# Patient Record
Sex: FEMALE | Race: WHITE | NOT HISPANIC OR LATINO | Employment: FULL TIME | URBAN - METROPOLITAN AREA
[De-identification: names, ages, dates, MRNs, and addresses within clinical notes are randomized per-mention and may not be internally consistent; named-entity substitution may affect disease eponyms.]

---

## 2020-10-19 ENCOUNTER — OFFICE VISIT (OUTPATIENT)
Dept: OBGYN CLINIC | Facility: CLINIC | Age: 35
End: 2020-10-19
Payer: COMMERCIAL

## 2020-10-19 VITALS
HEIGHT: 67 IN | TEMPERATURE: 97.9 F | WEIGHT: 155.4 LBS | SYSTOLIC BLOOD PRESSURE: 110 MMHG | DIASTOLIC BLOOD PRESSURE: 66 MMHG | BODY MASS INDEX: 24.39 KG/M2

## 2020-10-19 DIAGNOSIS — N89.8 VAGINAL DISCHARGE: Primary | ICD-10-CM

## 2020-10-19 PROCEDURE — 99203 OFFICE O/P NEW LOW 30 MIN: CPT | Performed by: OBSTETRICS & GYNECOLOGY

## 2020-11-02 ENCOUNTER — TELEPHONE (OUTPATIENT)
Dept: OBGYN CLINIC | Facility: CLINIC | Age: 35
End: 2020-11-02

## 2020-11-02 DIAGNOSIS — R30.0 DYSURIA: Primary | ICD-10-CM

## 2020-11-03 ENCOUNTER — OFFICE VISIT (OUTPATIENT)
Dept: INTERNAL MEDICINE CLINIC | Facility: CLINIC | Age: 35
End: 2020-11-03
Payer: COMMERCIAL

## 2020-11-03 VITALS
SYSTOLIC BLOOD PRESSURE: 112 MMHG | WEIGHT: 151.6 LBS | BODY MASS INDEX: 24.36 KG/M2 | HEART RATE: 98 BPM | RESPIRATION RATE: 18 BRPM | HEIGHT: 66 IN | DIASTOLIC BLOOD PRESSURE: 62 MMHG | TEMPERATURE: 98.2 F | OXYGEN SATURATION: 100 %

## 2020-11-03 DIAGNOSIS — R21 SKIN RASH: ICD-10-CM

## 2020-11-03 DIAGNOSIS — R30.0 DYSURIA: Primary | ICD-10-CM

## 2020-11-03 DIAGNOSIS — N94.9 GENITAL LESION, FEMALE: ICD-10-CM

## 2020-11-03 DIAGNOSIS — E04.1 THYROID NODULE: ICD-10-CM

## 2020-11-03 PROBLEM — J30.2 SEASONAL ALLERGIES: Status: ACTIVE | Noted: 2020-08-05

## 2020-11-03 PROBLEM — Z86.19 HISTORY OF LYME DISEASE: Status: ACTIVE | Noted: 2019-07-30

## 2020-11-03 LAB
BILIRUB UR QL STRIP: NEGATIVE
CLARITY UR: NORMAL
COLOR UR: YELLOW
GLUCOSE UR STRIP-MCNC: NEGATIVE MG/DL
HGB UR QL STRIP.AUTO: NEGATIVE
KETONES UR STRIP-MCNC: NEGATIVE MG/DL
LEUKOCYTE ESTERASE UR QL STRIP: NEGATIVE
NITRITE UR QL STRIP: NEGATIVE
PH UR STRIP.AUTO: 7.5 [PH]
PROT UR STRIP-MCNC: NEGATIVE MG/DL
SL AMB  POCT GLUCOSE, UA: ABNORMAL
SL AMB LEUKOCYTE ESTERASE,UA: ABNORMAL
SL AMB POCT BILIRUBIN,UA: ABNORMAL
SL AMB POCT BLOOD,UA: 10
SL AMB POCT CLARITY,UA: ABNORMAL
SL AMB POCT COLOR,UA: YELLOW
SL AMB POCT KETONES,UA: ABNORMAL
SL AMB POCT NITRITE,UA: ABNORMAL
SL AMB POCT PH,UA: 7.5
SL AMB POCT SPECIFIC GRAVITY,UA: 1.01
SL AMB POCT URINE PROTEIN: ABNORMAL
SL AMB POCT UROBILINOGEN: ABNORMAL
SP GR UR STRIP.AUTO: 1.02 (ref 1–1.03)
UROBILINOGEN UR QL STRIP.AUTO: 0.2 E.U./DL

## 2020-11-03 PROCEDURE — 81002 URINALYSIS NONAUTO W/O SCOPE: CPT | Performed by: INTERNAL MEDICINE

## 2020-11-03 PROCEDURE — 81003 URINALYSIS AUTO W/O SCOPE: CPT | Performed by: INTERNAL MEDICINE

## 2020-11-03 PROCEDURE — 3008F BODY MASS INDEX DOCD: CPT | Performed by: INTERNAL MEDICINE

## 2020-11-03 PROCEDURE — 3725F SCREEN DEPRESSION PERFORMED: CPT | Performed by: INTERNAL MEDICINE

## 2020-11-03 PROCEDURE — 1036F TOBACCO NON-USER: CPT | Performed by: INTERNAL MEDICINE

## 2020-11-03 PROCEDURE — 99204 OFFICE O/P NEW MOD 45 MIN: CPT | Performed by: INTERNAL MEDICINE

## 2020-11-04 ENCOUNTER — TELEPHONE (OUTPATIENT)
Dept: INTERNAL MEDICINE CLINIC | Facility: CLINIC | Age: 35
End: 2020-11-04

## 2020-11-04 ENCOUNTER — TELEPHONE (OUTPATIENT)
Dept: OBGYN CLINIC | Facility: CLINIC | Age: 35
End: 2020-11-04

## 2021-04-09 DIAGNOSIS — Z23 ENCOUNTER FOR IMMUNIZATION: ICD-10-CM

## 2021-05-04 NOTE — PROGRESS NOTES
Assessment/Plan:    No problem-specific Assessment & Plan notes found for this encounter  {Assess/PlanSmartLinks:72302}      Subjective:      Patient ID: Arlette Jane is a 38 y o  female  HPI    {Common ambulatory SmartLinks:81061}    Review of Systems      Objective: There were no vitals taken for this visit           Physical Exam

## 2021-05-05 ENCOUNTER — OFFICE VISIT (OUTPATIENT)
Dept: OBGYN CLINIC | Facility: CLINIC | Age: 36
End: 2021-05-05
Payer: COMMERCIAL

## 2021-05-05 VITALS
DIASTOLIC BLOOD PRESSURE: 74 MMHG | SYSTOLIC BLOOD PRESSURE: 116 MMHG | WEIGHT: 153.2 LBS | BODY MASS INDEX: 24.62 KG/M2 | HEIGHT: 66 IN

## 2021-05-05 DIAGNOSIS — L73.9 FOLLICULITIS: ICD-10-CM

## 2021-05-05 DIAGNOSIS — Z01.419 WELL WOMAN EXAM: Primary | ICD-10-CM

## 2021-05-05 DIAGNOSIS — N97.9 INFERTILITY, FEMALE: ICD-10-CM

## 2021-05-05 PROCEDURE — S0610 ANNUAL GYNECOLOGICAL EXAMINA: HCPCS | Performed by: PHYSICIAN ASSISTANT

## 2021-05-05 PROCEDURE — G0145 SCR C/V CYTO,THINLAYER,RESCR: HCPCS | Performed by: PHYSICIAN ASSISTANT

## 2021-05-05 NOTE — PATIENT INSTRUCTIONS
We did review infertility and pregnancy over 35  We will plan to do day 3 and day 21 labs  Partner will look into coverage for SA  Will plan f/u discussion after results back  May consider HSG next step and if all WNL, could consider additional of letrozole vs referral to [facility]  We did review vaginal exam and some tightness of the muscles at the vaginal introitus  We again reviewed lubrication and did also discuss PT as an option  Pt will consider in the future  She did tolerate exam well today  Pap done today as we do not have previous pap results from [place] where she lived previously

## 2021-05-05 NOTE — PROGRESS NOTES
Assessment/Plan   Diagnoses and all orders for this visit:    Well woman exam  -     Liquid-based pap, screening    Folliculitis    Infertility, female  -     Estradiol; Future  -     Progesterone; Future  -     TSH, 3rd generation; Future  -     T4, free; Future  -     Prolactin; Future  -     Antimullerian hormone (AMH); Future  -     Follicle stimulating hormone; Future  -     Luteinizing hormone; Future  -     Estradiol; Future  -     Progesterone; Future    Other orders  -     Prenatal MV-Min-Fe Fum-FA-DHA (PRENATAL 1 PO); Take by mouth        Discussion    All questions have been answered to her satisfaction  RTO for APE or sooner if needed      Subjective     HPI   Kassandra Ibarra is a 38 y o  female who presents for annual well woman exam    LMP - 4/19/21  Pt has been trying for pregnancy since wedding last July  Has been tracking w OPKs  Periods q 29-30 days, off BC 2 years ago  Having IC QOD during ovulation week  No vulvar itch/burn; No vaginal itch/burn; No abn discharge or odor; No urinary sx - burning/pain/frequency/hematuria  Pt was initially on schedule for vaginal lump which has since drained and is now feeling better  (+) SBEs - no breast masses, asymmetry, nipple discharge or bleeding, changes in skin of breast, or breast tenderness bilaterally    No abd/pelvic pain or HAs; No perimenopausal symptoms: No hot flashes/night sweats, sleeping well;     Pt is sexually active in a mutually monog/ sexual relationship; She has only had one lifetime partner and became sexually active after they were  last year  She declines std/hiv/hep testing; Feels safe at home  She does c/o dyspareunia since coitarche  We reviewed the importance of adequate lubrication w IC  We also reviewed that timing IC for pregnancy can put a strain on a lot of couples and make IC less enjoyable  Current contraception: none     Gardasil - had series  (+) PCP for routine Bw/care;     Last Pap - 2019   History of abnormal Pap smear: denies     Review of Systems   Constitutional: Negative  Negative for fatigue, fever and unexpected weight change  HENT: Negative for dental problem, mouth sores, nosebleeds, rhinorrhea, sinus pressure, sinus pain and sore throat  Eyes: Negative for pain, discharge and visual disturbance  Respiratory: Negative  Negative for cough, chest tightness, shortness of breath and wheezing  Cardiovascular: Negative for chest pain, palpitations and leg swelling  Gastrointestinal: Negative  Negative for blood in stool, constipation, diarrhea, nausea and vomiting  Endocrine: Negative  Negative for polydipsia  Genitourinary: Positive for dyspareunia  Negative for difficulty urinating, dysuria, menstrual problem, pelvic pain, urgency, vaginal discharge and vaginal pain  Musculoskeletal: Negative for arthralgias, back pain and joint swelling  Allergic/Immunologic: Negative for environmental allergies  Neurological: Negative for seizures, light-headedness and headaches  Hematological: Does not bruise/bleed easily  Psychiatric/Behavioral: Negative for sleep disturbance  The patient is not nervous/anxious          The following portions of the patient's history were reviewed and updated as appropriate: allergies, current medications, past family history, past medical history, past social history, past surgical history and problem list          OB History        0    Para   0    Term   0       0    AB   0    Living   0       SAB   0    TAB   0    Ectopic   0    Multiple   0    Live Births   0           Obstetric Comments   Menarche: 14             Past Medical History:   Diagnosis Date    History of Lyme disease     patient stated it was years ago       Past Surgical History:   Procedure Laterality Date    HYMENPLASTY      WISDOM TOOTH EXTRACTION         Family History   Problem Relation Age of Onset    Thyroid disease Mother     Prostate cancer Father 58    Melanoma Brother     Osteoporosis Maternal Grandmother     Osteoporosis Maternal Aunt        Social History     Socioeconomic History    Marital status: /Civil Union     Spouse name: Not on file    Number of children: Not on file    Years of education: Not on file    Highest education level: Not on file   Occupational History    Not on file   Social Needs    Financial resource strain: Not on file    Food insecurity     Worry: Not on file     Inability: Not on file   [employer] needs     Medical: Not on file     Non-medical: Not on file   Tobacco Use    Smoking status: Never Smoker    Smokeless tobacco: Never Used   Substance and Sexual Activity    Alcohol use: Not Currently    Drug use: Never    Sexual activity: Yes     Partners: Male     Birth control/protection: None   Lifestyle    Physical activity     Days per week: Not on file     Minutes per session: Not on file    Stress: Not on file   Relationships    Social connections     Talks on phone: Not on file     Gets together: Not on file     Attends Restoration service: Not on file     Active member of club or organization: Not on file     Attends meetings of clubs or organizations: Not on file     Relationship status: Not on file    Intimate partner violence     Fear of current or ex partner: Not on file     Emotionally abused: Not on file     Physically abused: Not on file     Forced sexual activity: Not on file   Other Topics Concern    Not on file   Social History Narrative    Not on file         Current Outpatient Medications:     Prenatal MV-Min-Fe Fum-FA-DHA (PRENATAL 1 PO), Take by mouth, Disp: , Rfl:     Allergies   Allergen Reactions    No Known Allergies        Objective   Vitals:    05/05/21 0843   BP: 116/74   Weight: 69 5 kg (153 lb 3 2 oz)   Height: 5' 6" (1 676 m)     Physical Exam  Constitutional:       Appearance: She is well-developed  HENT:      Head: Normocephalic and atraumatic  Cardiovascular:      Rate and Rhythm: Normal rate and regular rhythm  Pulmonary:      Effort: Pulmonary effort is normal       Breath sounds: Normal breath sounds  Chest:      Breasts: Breasts are symmetrical          Right: No inverted nipple, mass, nipple discharge, skin change or tenderness  Left: No inverted nipple, mass, nipple discharge, skin change or tenderness  Abdominal:      General: There is no distension  Palpations: Abdomen is soft  There is no mass  Tenderness: There is no guarding or rebound  Genitourinary:     Exam position: Supine  Pubic Area: Rash present  Labia:         Right: No rash  Left: No rash  Vagina: No signs of injury and foreign body  No vaginal discharge or erythema  Cervix: No cervical motion tenderness  Adnexa:         Right: No mass  Left: No mass  Rectum: Guaiac result negative  Comments: With mildly erythematous lesion present on the left upper part of the mons pubis with scabbing centrally c/w previous folliculitis that has ruptured and is improving  Some tightness noted in the muscles at the vaginal introitus when bimanual done  Pt did tolerate regular sized Margarette speculum  Skin:     General: Skin is warm and dry  Neurological:      Mental Status: She is alert and oriented to person, place, and time  Patient Instructions   We did review infertility and pregnancy over 35  We will plan to do day 3 and day 21 labs  Partner will look into coverage for SA  Will plan f/u discussion after results back  May consider HSG next step and if all WNL, could consider additional of letrozole vs referral to [facility]  We did review vaginal exam and some tightness of the muscles at the vaginal introitus  We again reviewed lubrication and did also discuss PT as an option  Pt will consider in the future  She did tolerate exam well today     Pap done today as we do not have previous pap results from [place] where she lived previously

## 2021-05-13 LAB
ESTRADIOL SERPL-MCNC: 82 PG/ML
LAB AP GYN PRIMARY INTERPRETATION: NORMAL
Lab: NORMAL
MIS SERPL-MCNC: 1.5 NG/ML (ref 0.18–5.68)
PROGEST SERPL-MCNC: 15 NG/ML
PROLACTIN SERPL-MCNC: 21.6 NG/ML
T4 FREE SERPL-MCNC: 1.2 NG/DL (ref 0.8–1.8)
TSH SERPL-ACNC: 2.24 MIU/L

## 2021-05-28 LAB
ESTRADIOL SERPL-MCNC: 48 PG/ML
FSH SERPL-ACNC: 6.8 MIU/ML
LH SERPL-ACNC: 10.2 MIU/ML
PROGEST SERPL-MCNC: 0.5 NG/ML

## 2021-06-10 ENCOUNTER — TELEPHONE (OUTPATIENT)
Dept: OBGYN CLINIC | Facility: CLINIC | Age: 36
End: 2021-06-10

## 2021-06-10 NOTE — TELEPHONE ENCOUNTER
Pt called and said she called the places where her  would need a semen anaysis and they said they would need an order from us  for us to receive the results         [address] [place] [address] Reproductive Medicine   Tel   [phone]

## 2021-06-21 ENCOUNTER — TELEPHONE (OUTPATIENT)
Dept: OBGYN CLINIC | Facility: CLINIC | Age: 36
End: 2021-06-21

## 2021-06-21 NOTE — TELEPHONE ENCOUNTER
Pt called asking if the test results are in for the semen analysis to discuss at her appt tomorrow  Please call pt and let her know

## 2021-06-22 ENCOUNTER — OFFICE VISIT (OUTPATIENT)
Dept: INTERNAL MEDICINE CLINIC | Facility: CLINIC | Age: 36
End: 2021-06-22
Payer: COMMERCIAL

## 2021-06-22 VITALS
HEIGHT: 66 IN | HEART RATE: 68 BPM | WEIGHT: 150 LBS | TEMPERATURE: 98.3 F | DIASTOLIC BLOOD PRESSURE: 72 MMHG | OXYGEN SATURATION: 98 % | BODY MASS INDEX: 24.11 KG/M2 | RESPIRATION RATE: 15 BRPM | SYSTOLIC BLOOD PRESSURE: 118 MMHG

## 2021-06-22 DIAGNOSIS — Z13.6 ENCOUNTER FOR LIPID SCREENING FOR CARDIOVASCULAR DISEASE: ICD-10-CM

## 2021-06-22 DIAGNOSIS — G56.03 CARPAL TUNNEL SYNDROME, BILATERAL: ICD-10-CM

## 2021-06-22 DIAGNOSIS — Z00.00 WELLNESS EXAMINATION: Primary | ICD-10-CM

## 2021-06-22 DIAGNOSIS — Z13.220 ENCOUNTER FOR LIPID SCREENING FOR CARDIOVASCULAR DISEASE: ICD-10-CM

## 2021-06-22 DIAGNOSIS — E04.1 THYROID NODULE: ICD-10-CM

## 2021-06-22 PROCEDURE — 99395 PREV VISIT EST AGE 18-39: CPT | Performed by: INTERNAL MEDICINE

## 2021-06-22 PROCEDURE — 3725F SCREEN DEPRESSION PERFORMED: CPT | Performed by: INTERNAL MEDICINE

## 2021-06-22 PROCEDURE — 1036F TOBACCO NON-USER: CPT | Performed by: INTERNAL MEDICINE

## 2021-06-22 RX ORDER — PHENYLEPHRINE HYDROCHLORIDE 10 MG/1
TABLET, COATED ORAL
Qty: 1 EACH | Refills: 0 | Status: SHIPPED | OUTPATIENT
Start: 2021-06-22 | End: 2022-06-02 | Stop reason: ALTCHOICE

## 2021-06-22 NOTE — PROGRESS NOTES
Assessment/Plan:     Diagnoses and all orders for this visit:    Wellness examination    Thyroid nodule  Comments:  US ordered, patient to send previous records to my office  Orders:  -     US thyroid; Future  -     Basic metabolic panel; Future  -     TSH, 3rd generation with Free T4 reflex; Future    Carpal tunnel syndrome, bilateral  Comments:  advised to resume using wrist braces for several weeks  advised on better ergonomics when using computer as 5th digit numnbess likely mechanical/positional  Orders:  -     Elastic Bandages & Supports (Carpal Tunnel Wrist Stabilizer) MISC; Use daily at bedtime Right wrist, please fit to size    Encounter for lipid screening for cardiovascular disease  -     Lipid Panel with Direct LDL reflex; Future          Subjective:      Patient ID: Jackie Colindres is a 38 y o  female  HPI    Here for physical, taking only OTC meds prn  Saw dermatology yesterday for skin exam   UTD on pap smear and blood work as ordered by her Gyn  last month  Has records related to her hx of thyroid nodule from [place] and will send to my office via [address]  Exercising regularly  Works as   UTD on COVID-19 vaccine  C/o left 5th digit numbness while using the computer for work  Doesn't occur unless using computer and relieved when stopped using computer  Hx of carpal tunnel syndrome in both wrists in the past and has wrist brace for left hand still  ROS otherwise negative, no other complaints  Past Medical History:   Diagnosis Date    History of Lyme disease     patient stated it was years ago     Vitals:    06/22/21 1302   BP: 118/72   Pulse: 68   Resp: 15   Temp: 98 3 °F (36 8 °C)   SpO2: 98%   Weight: 68 kg (150 lb)   Height: 5' 6" (1 676 m)     Body mass index is 24 21 kg/m²      Current Outpatient Medications:     Elastic Bandages & Supports (Carpal Tunnel Wrist Stabilizer) MISC, Use daily at bedtime Right wrist, please fit to size, Disp: 1 each, Rfl: 0   Prenatal MV-Min-Fe Fum-FA-DHA (PRENATAL 1 PO), Take by mouth (Patient not taking: Reported on 6/22/2021), Disp: , Rfl:   Allergies   Allergen Reactions    No Known Allergies          Review of Systems   Constitutional: Negative for fever  HENT: Negative for congestion  Eyes: Negative for visual disturbance  Respiratory: Negative for shortness of breath  Cardiovascular: Negative for chest pain  Gastrointestinal: Negative for abdominal pain  Endocrine: Negative for polyuria  Genitourinary: Negative for difficulty urinating  Musculoskeletal: Negative for arthralgias  Skin: Negative for rash  Allergic/Immunologic: Negative for immunocompromised state  Neurological: Positive for numbness  Psychiatric/Behavioral: Negative for dysphoric mood  Objective:      /72   Pulse 68   Temp 98 3 °F (36 8 °C)   Resp 15   Ht 5' 6" (1 676 m)   Wt 68 kg (150 lb)   SpO2 98%   BMI 24 21 kg/m²          Physical Exam  Vitals reviewed  Constitutional:       Appearance: Normal appearance  HENT:      Head: Normocephalic and atraumatic  Cardiovascular:      Rate and Rhythm: Normal rate and regular rhythm  Heart sounds: No murmur heard  Pulmonary:      Effort: Pulmonary effort is normal       Breath sounds: No wheezing or rales  Abdominal:      General: Bowel sounds are normal       Palpations: Abdomen is soft  Tenderness: There is no abdominal tenderness  Musculoskeletal:      Right lower leg: No edema  Left lower leg: No edema  Neurological:      Mental Status: She is alert  Sensory: No sensory deficit (no numbness now)        Comments: Motor strength 5/5 upper extremity b/l   positive tinel's on left   Psychiatric:         Mood and Affect: Mood normal          Behavior: Behavior normal

## 2021-06-29 ENCOUNTER — OFFICE VISIT (OUTPATIENT)
Dept: OBGYN CLINIC | Facility: CLINIC | Age: 36
End: 2021-06-29
Payer: COMMERCIAL

## 2021-06-29 VITALS
SYSTOLIC BLOOD PRESSURE: 122 MMHG | HEIGHT: 66 IN | WEIGHT: 151.6 LBS | BODY MASS INDEX: 24.36 KG/M2 | DIASTOLIC BLOOD PRESSURE: 76 MMHG

## 2021-06-29 DIAGNOSIS — Z31.9 INFERTILITY MANAGEMENT: Primary | ICD-10-CM

## 2021-06-29 PROCEDURE — 99213 OFFICE O/P EST LOW 20 MIN: CPT | Performed by: PHYSICIAN ASSISTANT

## 2021-06-29 NOTE — PATIENT INSTRUCTIONS
At this point they will further discuss and decide how they wish to proceed  If Odette Mariah desires to repeat the SA, he will let us know so we can send an order in for the repeat  They may also just decide to establish at [facility] to discuss more aggressive tx options going forward as well  All questions answered and aware we are here to help guide them further if needed  Aware if they repeat SA and parameters are normal, may consider oral meds for pt here in the office if they desire

## 2021-06-29 NOTE — PROGRESS NOTES
Assessment/Plan:    No problem-specific Assessment & Plan notes found for this encounter  Diagnoses and all orders for this visit:    Infertility management  -     Ambulatory referral to Obstetrics / Gynecology          Subjective:      Patient ID: Yesica Houser is a 38 y o  female  Pt presents today for discussion of partners SA results  Results showed:   Decreased concentration, decreased rate of motility and decreased morphology (1% tobar's strict scale)  I r/w pt and partner her labs form day 3 as well as day 21  All hormonal levels WNL  We did review abnormal SA parameters  Partner notes that SA was done approx 5-6 hours after specimen was collected  We did review the option to repeat the sample  I r/w pt and partner that I would not likely plan to proceed with any ovulation induction medication for pt with her partner having an abnormal SA  We further reviewed possible [facility] tx options including IUI vs IVF with higher pregnancy success rates as well  All questions answered  The following portions of the patient's history were reviewed and updated as appropriate: allergies, current medications, past family history, past medical history, past social history, past surgical history and problem list     Review of Systems   Constitutional: Negative  Respiratory: Negative  Gastrointestinal: Negative  Endocrine: Negative  Genitourinary: Negative  Objective:      /76 (BP Location: Left arm, Patient Position: Sitting, Cuff Size: Standard)   Ht 5' 6" (1 676 m)   Wt 68 8 kg (151 lb 9 6 oz)   LMP 06/20/2021   BMI 24 47 kg/m²          Physical Exam  Constitutional:       Appearance: She is normal weight  HENT:      Head: Normocephalic and atraumatic  Neurological:      General: No focal deficit present  Mental Status: She is alert and oriented to person, place, and time     Psychiatric:         Mood and Affect: Mood normal          Behavior: Behavior normal          Thought Content: Thought content normal          Judgment: Judgment normal            Patient Instructions   At this point they will further discuss and decide how they wish to proceed  If Loly Sanchez desires to repeat the SA, he will let us know so we can send an order in for the repeat  They may also just decide to establish at [facility] to discuss more aggressive tx options going forward as well  All questions answered and aware we are here to help guide them further if needed  Aware if they repeat SA and parameters are normal, may consider oral meds for pt here in the office if they desire

## 2021-08-03 ENCOUNTER — HOSPITAL ENCOUNTER (OUTPATIENT)
Dept: ULTRASOUND IMAGING | Facility: HOSPITAL | Age: 36
Discharge: HOME | End: 2021-08-03
Payer: COMMERCIAL

## 2021-08-03 DIAGNOSIS — E04.1 THYROID NODULE: ICD-10-CM

## 2021-08-03 PROCEDURE — 76536 US EXAM OF HEAD AND NECK: CPT

## 2021-08-11 ENCOUNTER — TELEPHONE (OUTPATIENT)
Dept: INTERNAL MEDICINE CLINIC | Facility: CLINIC | Age: 36
End: 2021-08-11

## 2021-08-11 DIAGNOSIS — R93.89 THYROID WITH HETEROGENEOUS ECHOTEXTURE DETERMINED BY ULTRASOUND: Primary | ICD-10-CM

## 2021-08-11 NOTE — TELEPHONE ENCOUNTER
PT CALLED BACK, WE WENT OVER HER RESULTS AND SHE SAID THAT SHE'LL GO FOR THE BLOODWORK     DIDN'T ASK ANY QUESTIONS AT THAT TIME

## 2021-08-11 NOTE — TELEPHONE ENCOUNTER
Sajan Fernando is calling to ask about her thyroid ultrasound results  She saw it on my chart but has questions    Can you please call her?    [phone]

## 2021-08-11 NOTE — TELEPHONE ENCOUNTER
Called pt's provided phone #, rec'd voicemail and left message on voicemail for call back to office    When Mariella De Jesus calls back, please advise:    Thyroid US results do not show any nodules in thyroid      The thyroid appears slightly enlarged, recommend some add'l BW at this time    Labs ordered, no fasting needed    Let me know if ?'s

## 2021-08-16 ENCOUNTER — APPOINTMENT (OUTPATIENT)
Dept: LAB | Facility: CLINIC | Age: 36
End: 2021-08-16
Payer: COMMERCIAL

## 2021-08-16 DIAGNOSIS — R93.89 THYROID WITH HETEROGENEOUS ECHOTEXTURE DETERMINED BY ULTRASOUND: ICD-10-CM

## 2021-08-16 DIAGNOSIS — N97.9 INFERTILITY, FEMALE: ICD-10-CM

## 2021-08-16 LAB
ESTRADIOL SERPL-MCNC: 81 PG/ML
FSH SERPL-ACNC: 6.5 MIU/ML
LH SERPL-ACNC: 5.9 MIU/ML
PROGEST SERPL-MCNC: 0.5 NG/ML
PROLACTIN SERPL-MCNC: 12.9 NG/ML
T4 FREE SERPL-MCNC: 0.98 NG/DL (ref 0.76–1.46)
TSH SERPL DL<=0.05 MIU/L-ACNC: 1.82 UIU/ML (ref 0.36–3.74)

## 2021-08-16 PROCEDURE — 84439 ASSAY OF FREE THYROXINE: CPT

## 2021-08-16 PROCEDURE — 83002 ASSAY OF GONADOTROPIN (LH): CPT

## 2021-08-16 PROCEDURE — 83516 IMMUNOASSAY NONANTIBODY: CPT

## 2021-08-16 PROCEDURE — 84146 ASSAY OF PROLACTIN: CPT

## 2021-08-16 PROCEDURE — 86376 MICROSOMAL ANTIBODY EACH: CPT

## 2021-08-16 PROCEDURE — 82670 ASSAY OF TOTAL ESTRADIOL: CPT

## 2021-08-16 PROCEDURE — 86800 THYROGLOBULIN ANTIBODY: CPT

## 2021-08-16 PROCEDURE — 84443 ASSAY THYROID STIM HORMONE: CPT | Performed by: INTERNAL MEDICINE

## 2021-08-16 PROCEDURE — 84144 ASSAY OF PROGESTERONE: CPT

## 2021-08-16 PROCEDURE — 83001 ASSAY OF GONADOTROPIN (FSH): CPT

## 2021-08-16 PROCEDURE — 36415 COLL VENOUS BLD VENIPUNCTURE: CPT

## 2021-08-17 LAB
THYROGLOB AB SERPL-ACNC: <1 IU/ML (ref 0–0.9)
THYROPEROXIDASE AB SERPL-ACNC: 10 IU/ML (ref 0–34)

## 2021-08-18 ENCOUNTER — TELEPHONE (OUTPATIENT)
Dept: INTERNAL MEDICINE CLINIC | Facility: CLINIC | Age: 36
End: 2021-08-18

## 2021-08-18 DIAGNOSIS — M54.2 ANTERIOR NECK PAIN: ICD-10-CM

## 2021-08-18 DIAGNOSIS — R93.89 THYROID WITH HETEROGENEOUS ECHOTEXTURE DETERMINED BY ULTRASOUND: Primary | ICD-10-CM

## 2021-08-18 NOTE — TELEPHONE ENCOUNTER
Loi Verdugo is called to ask about her ultrasound results  She saw them on [facility]        [phone] 13-[birth year]2018

## 2021-08-18 NOTE — TELEPHONE ENCOUNTER
Called and spoke to patient    Advised I d/w endocrine which prompted ordering of BW  Reviewed BW results notable for Thyroid Ab and TFTs being WNL    Patient has soreness at area of thyroid gland and had 3mm nodule in past(see report scanned into chart under media tab)   Now has heterogenous appearance of thyroid on US    Plan - endo eval

## 2021-08-19 LAB — MIS SERPL-MCNC: 2.62 NG/ML

## 2021-10-12 ENCOUNTER — TELEPHONE (OUTPATIENT)
Dept: INTERNAL MEDICINE CLINIC | Facility: CLINIC | Age: 36
End: 2021-10-12

## 2021-10-13 ENCOUNTER — OFFICE VISIT (OUTPATIENT)
Dept: INTERNAL MEDICINE CLINIC | Facility: CLINIC | Age: 36
End: 2021-10-13
Payer: COMMERCIAL

## 2021-10-13 VITALS
SYSTOLIC BLOOD PRESSURE: 92 MMHG | OXYGEN SATURATION: 100 % | RESPIRATION RATE: 16 BRPM | WEIGHT: 154.6 LBS | TEMPERATURE: 98.2 F | HEART RATE: 80 BPM | HEIGHT: 66 IN | BODY MASS INDEX: 24.85 KG/M2 | DIASTOLIC BLOOD PRESSURE: 78 MMHG

## 2021-10-13 DIAGNOSIS — R42 DIZZINESSES: Primary | ICD-10-CM

## 2021-10-13 DIAGNOSIS — E86.0 MILD DEHYDRATION: ICD-10-CM

## 2021-10-13 DIAGNOSIS — R03.1 INCIDENTAL FINDING OF LOW BLOOD PRESSURE: ICD-10-CM

## 2021-10-13 PROCEDURE — 99213 OFFICE O/P EST LOW 20 MIN: CPT | Performed by: INTERNAL MEDICINE

## 2021-10-13 RX ORDER — FLUTICASONE PROPIONATE 50 MCG
1 SPRAY, SUSPENSION (ML) NASAL DAILY
COMMUNITY
End: 2022-06-02 | Stop reason: ALTCHOICE

## 2021-10-13 RX ORDER — FEXOFENADINE HCL 180 MG/1
180 TABLET ORAL DAILY
COMMUNITY
End: 2022-06-02 | Stop reason: ALTCHOICE

## 2021-10-14 ENCOUNTER — HOSPITAL ENCOUNTER (OUTPATIENT)
Dept: INFUSION CENTER | Facility: CLINIC | Age: 36
Discharge: HOME | End: 2021-10-14
Payer: COMMERCIAL

## 2021-10-14 VITALS
DIASTOLIC BLOOD PRESSURE: 57 MMHG | TEMPERATURE: 98.2 F | RESPIRATION RATE: 18 BRPM | OXYGEN SATURATION: 98 % | HEART RATE: 69 BPM | SYSTOLIC BLOOD PRESSURE: 119 MMHG

## 2021-10-14 DIAGNOSIS — E86.0 MILD DEHYDRATION: Primary | ICD-10-CM

## 2021-10-14 DIAGNOSIS — R42 DIZZINESSES: ICD-10-CM

## 2021-10-14 DIAGNOSIS — R03.1 INCIDENTAL FINDING OF LOW BLOOD PRESSURE: ICD-10-CM

## 2021-10-14 PROCEDURE — 96360 HYDRATION IV INFUSION INIT: CPT

## 2021-10-14 RX ADMIN — SODIUM CHLORIDE 1000 ML: 0.9 INJECTION, SOLUTION INTRAVENOUS at 14:43

## 2021-10-15 ENCOUNTER — TELEPHONE (OUTPATIENT)
Dept: INTERNAL MEDICINE CLINIC | Facility: CLINIC | Age: 36
End: 2021-10-15

## 2021-10-17 ENCOUNTER — OFFICE VISIT (OUTPATIENT)
Dept: URGENT CARE | Facility: CLINIC | Age: 36
End: 2021-10-17
Payer: COMMERCIAL

## 2021-10-17 VITALS
HEART RATE: 74 BPM | WEIGHT: 155 LBS | BODY MASS INDEX: 24.91 KG/M2 | RESPIRATION RATE: 18 BRPM | SYSTOLIC BLOOD PRESSURE: 118 MMHG | TEMPERATURE: 98.1 F | HEIGHT: 66 IN | OXYGEN SATURATION: 100 % | DIASTOLIC BLOOD PRESSURE: 80 MMHG

## 2021-10-17 DIAGNOSIS — R42 VERTIGO: Primary | ICD-10-CM

## 2021-10-17 PROCEDURE — G0382 LEV 3 HOSP TYPE B ED VISIT: HCPCS | Performed by: PHYSICIAN ASSISTANT

## 2021-10-17 PROCEDURE — S9083 URGENT CARE CENTER GLOBAL: HCPCS | Performed by: PHYSICIAN ASSISTANT

## 2021-10-17 RX ORDER — MECLIZINE HYDROCHLORIDE 25 MG/1
25 TABLET ORAL 3 TIMES DAILY PRN
Qty: 6 TABLET | Refills: 0 | Status: SHIPPED | OUTPATIENT
Start: 2021-10-17 | End: 2021-10-21

## 2021-10-17 RX ORDER — AMILORIDE HCL 5 MG
10 TABLET ORAL AS NEEDED
COMMUNITY
End: 2021-10-21

## 2021-10-18 ENCOUNTER — TELEPHONE (OUTPATIENT)
Dept: INTERNAL MEDICINE CLINIC | Facility: CLINIC | Age: 36
End: 2021-10-18

## 2021-10-18 DIAGNOSIS — R03.1 INCIDENTAL FINDING OF LOW BLOOD PRESSURE: ICD-10-CM

## 2021-10-18 DIAGNOSIS — R42 DIZZINESSES: Primary | ICD-10-CM

## 2021-10-18 DIAGNOSIS — R93.89 THYROID WITH HETEROGENEOUS ECHOTEXTURE DETERMINED BY ULTRASOUND: ICD-10-CM

## 2021-10-19 ENCOUNTER — OFFICE VISIT (OUTPATIENT)
Dept: INTERNAL MEDICINE CLINIC | Facility: CLINIC | Age: 36
End: 2021-10-19
Payer: COMMERCIAL

## 2021-10-19 ENCOUNTER — APPOINTMENT (OUTPATIENT)
Dept: LAB | Facility: CLINIC | Age: 36
End: 2021-10-19
Payer: COMMERCIAL

## 2021-10-19 VITALS
TEMPERATURE: 98.1 F | HEIGHT: 66 IN | WEIGHT: 155 LBS | HEART RATE: 89 BPM | BODY MASS INDEX: 24.91 KG/M2 | DIASTOLIC BLOOD PRESSURE: 68 MMHG | SYSTOLIC BLOOD PRESSURE: 100 MMHG | OXYGEN SATURATION: 98 % | RESPIRATION RATE: 16 BRPM

## 2021-10-19 DIAGNOSIS — R42 DIZZINESSES: Primary | ICD-10-CM

## 2021-10-19 DIAGNOSIS — R03.1 INCIDENTAL FINDING OF LOW BLOOD PRESSURE: ICD-10-CM

## 2021-10-19 DIAGNOSIS — R42 DIZZINESSES: ICD-10-CM

## 2021-10-19 DIAGNOSIS — S16.1XXA NECK MUSCLE STRAIN, INITIAL ENCOUNTER: ICD-10-CM

## 2021-10-19 DIAGNOSIS — M54.2 BILATERAL POSTERIOR NECK PAIN: ICD-10-CM

## 2021-10-19 DIAGNOSIS — R51.9 INTERMITTENT HEADACHE: ICD-10-CM

## 2021-10-19 DIAGNOSIS — L56.8 PHOTOSENSITIVITY: ICD-10-CM

## 2021-10-19 DIAGNOSIS — R26.9 GAIT ABNORMALITY: ICD-10-CM

## 2021-10-19 LAB
ALBUMIN SERPL BCP-MCNC: 4.1 G/DL (ref 3.5–5)
ALP SERPL-CCNC: 55 U/L (ref 46–116)
ALT SERPL W P-5'-P-CCNC: 22 U/L (ref 12–78)
ANION GAP SERPL CALCULATED.3IONS-SCNC: 12 MMOL/L (ref 4–13)
AST SERPL W P-5'-P-CCNC: 15 U/L (ref 5–45)
BASOPHILS # BLD AUTO: 0.02 THOUSANDS/ΜL (ref 0–0.1)
BASOPHILS NFR BLD AUTO: 0 % (ref 0–1)
BILIRUB SERPL-MCNC: 1.03 MG/DL (ref 0.2–1)
BUN SERPL-MCNC: 13 MG/DL (ref 5–25)
CALCIUM SERPL-MCNC: 8.7 MG/DL (ref 8.3–10.1)
CHLORIDE SERPL-SCNC: 105 MMOL/L (ref 100–108)
CO2 SERPL-SCNC: 26 MMOL/L (ref 21–32)
CREAT SERPL-MCNC: 0.89 MG/DL (ref 0.6–1.3)
EOSINOPHIL # BLD AUTO: 0.06 THOUSAND/ΜL (ref 0–0.61)
EOSINOPHIL NFR BLD AUTO: 1 % (ref 0–6)
ERYTHROCYTE [DISTWIDTH] IN BLOOD BY AUTOMATED COUNT: 12.1 % (ref 11.6–15.1)
GFR SERPL CREATININE-BSD FRML MDRD: 84 ML/MIN/1.73SQ M
GLUCOSE SERPL-MCNC: 109 MG/DL (ref 65–140)
HCT VFR BLD AUTO: 40.2 % (ref 34.8–46.1)
HGB BLD-MCNC: 13 G/DL (ref 11.5–15.4)
IMM GRANULOCYTES # BLD AUTO: 0.02 THOUSAND/UL (ref 0–0.2)
IMM GRANULOCYTES NFR BLD AUTO: 0 % (ref 0–2)
LYMPHOCYTES # BLD AUTO: 2.43 THOUSANDS/ΜL (ref 0.6–4.47)
LYMPHOCYTES NFR BLD AUTO: 33 % (ref 14–44)
MCH RBC QN AUTO: 29.9 PG (ref 26.8–34.3)
MCHC RBC AUTO-ENTMCNC: 32.3 G/DL (ref 31.4–37.4)
MCV RBC AUTO: 92 FL (ref 82–98)
MONOCYTES # BLD AUTO: 0.32 THOUSAND/ΜL (ref 0.17–1.22)
MONOCYTES NFR BLD AUTO: 4 % (ref 4–12)
NEUTROPHILS # BLD AUTO: 4.49 THOUSANDS/ΜL (ref 1.85–7.62)
NEUTS SEG NFR BLD AUTO: 62 % (ref 43–75)
NRBC BLD AUTO-RTO: 0 /100 WBCS
PLATELET # BLD AUTO: 250 THOUSANDS/UL (ref 149–390)
PMV BLD AUTO: 9.3 FL (ref 8.9–12.7)
POTASSIUM SERPL-SCNC: 3.9 MMOL/L (ref 3.5–5.3)
PROT SERPL-MCNC: 7.3 G/DL (ref 6.4–8.2)
RBC # BLD AUTO: 4.35 MILLION/UL (ref 3.81–5.12)
SODIUM SERPL-SCNC: 143 MMOL/L (ref 136–145)
TSH SERPL DL<=0.05 MIU/L-ACNC: 1.48 UIU/ML (ref 0.36–3.74)
WBC # BLD AUTO: 7.34 THOUSAND/UL (ref 4.31–10.16)

## 2021-10-19 PROCEDURE — 85025 COMPLETE CBC W/AUTO DIFF WBC: CPT | Performed by: INTERNAL MEDICINE

## 2021-10-19 PROCEDURE — 3008F BODY MASS INDEX DOCD: CPT | Performed by: INTERNAL MEDICINE

## 2021-10-19 PROCEDURE — 84443 ASSAY THYROID STIM HORMONE: CPT | Performed by: INTERNAL MEDICINE

## 2021-10-19 PROCEDURE — 1036F TOBACCO NON-USER: CPT | Performed by: INTERNAL MEDICINE

## 2021-10-19 PROCEDURE — 99214 OFFICE O/P EST MOD 30 MIN: CPT | Performed by: INTERNAL MEDICINE

## 2021-10-19 PROCEDURE — 80053 COMPREHEN METABOLIC PANEL: CPT

## 2021-10-19 PROCEDURE — 36415 COLL VENOUS BLD VENIPUNCTURE: CPT | Performed by: INTERNAL MEDICINE

## 2021-10-19 RX ORDER — METHOCARBAMOL 500 MG/1
500 TABLET, FILM COATED ORAL EVERY 12 HOURS PRN
Qty: 15 TABLET | Refills: 0 | Status: SHIPPED | OUTPATIENT
Start: 2021-10-19 | End: 2022-03-22

## 2021-10-20 ENCOUNTER — HOSPITAL ENCOUNTER (OUTPATIENT)
Dept: RADIOLOGY | Facility: HOSPITAL | Age: 36
Discharge: HOME | End: 2021-10-20
Payer: COMMERCIAL

## 2021-10-20 ENCOUNTER — TELEPHONE (OUTPATIENT)
Dept: INTERNAL MEDICINE CLINIC | Facility: CLINIC | Age: 36
End: 2021-10-20

## 2021-10-20 DIAGNOSIS — R42 DIZZINESSES: ICD-10-CM

## 2021-10-20 DIAGNOSIS — R26.9 GAIT ABNORMALITY: ICD-10-CM

## 2021-10-20 DIAGNOSIS — L56.8 PHOTOSENSITIVITY: ICD-10-CM

## 2021-10-20 DIAGNOSIS — R51.9 INTERMITTENT HEADACHE: ICD-10-CM

## 2021-10-20 DIAGNOSIS — M54.2 BILATERAL POSTERIOR NECK PAIN: ICD-10-CM

## 2021-10-20 LAB — CORTIS AM PEAK SERPL-MCNC: 21.6 MCG/DL

## 2021-10-20 PROCEDURE — G1004 CDSM NDSC: HCPCS

## 2021-10-20 PROCEDURE — 72156 MRI NECK SPINE W/O & W/DYE: CPT

## 2021-10-20 PROCEDURE — 70553 MRI BRAIN STEM W/O & W/DYE: CPT

## 2021-10-20 PROCEDURE — A9585 GADOBUTROL INJECTION: HCPCS | Performed by: INTERNAL MEDICINE

## 2021-10-20 RX ADMIN — GADOBUTROL 7 ML: 604.72 INJECTION INTRAVENOUS at 22:33

## 2021-10-21 ENCOUNTER — TELEPHONE (OUTPATIENT)
Dept: INTERNAL MEDICINE CLINIC | Facility: CLINIC | Age: 36
End: 2021-10-21

## 2021-10-21 ENCOUNTER — PATIENT MESSAGE (OUTPATIENT)
Dept: INTERNAL MEDICINE CLINIC | Facility: CLINIC | Age: 36
End: 2021-10-21

## 2021-10-21 ENCOUNTER — TELEPHONE (OUTPATIENT)
Dept: NEUROLOGY | Facility: CLINIC | Age: 36
End: 2021-10-21

## 2021-10-21 DIAGNOSIS — M54.2 BILATERAL POSTERIOR NECK PAIN: ICD-10-CM

## 2021-10-21 DIAGNOSIS — R42 DIZZINESSES: Primary | ICD-10-CM

## 2021-10-21 DIAGNOSIS — R51.9 INTERMITTENT HEADACHE: ICD-10-CM

## 2021-10-21 DIAGNOSIS — L56.8 PHOTOSENSITIVITY: ICD-10-CM

## 2021-10-26 ENCOUNTER — EVALUATION (OUTPATIENT)
Dept: PHYSICAL THERAPY | Facility: REHABILITATION | Age: 36
End: 2021-10-26
Payer: COMMERCIAL

## 2021-10-26 DIAGNOSIS — R42 VERTIGO: ICD-10-CM

## 2021-10-26 PROCEDURE — 97162 PT EVAL MOD COMPLEX 30 MIN: CPT

## 2021-10-26 PROCEDURE — 97110 THERAPEUTIC EXERCISES: CPT

## 2021-10-26 PROCEDURE — 97140 MANUAL THERAPY 1/> REGIONS: CPT

## 2021-10-29 ENCOUNTER — OFFICE VISIT (OUTPATIENT)
Dept: PHYSICAL THERAPY | Facility: REHABILITATION | Age: 36
End: 2021-10-29
Payer: COMMERCIAL

## 2021-10-29 DIAGNOSIS — R42 VERTIGO: Primary | ICD-10-CM

## 2021-10-29 PROCEDURE — 97110 THERAPEUTIC EXERCISES: CPT

## 2021-10-29 PROCEDURE — 97112 NEUROMUSCULAR REEDUCATION: CPT

## 2021-10-29 PROCEDURE — 97140 MANUAL THERAPY 1/> REGIONS: CPT

## 2021-11-02 ENCOUNTER — OFFICE VISIT (OUTPATIENT)
Dept: PHYSICAL THERAPY | Facility: REHABILITATION | Age: 36
End: 2021-11-02
Payer: COMMERCIAL

## 2021-11-02 ENCOUNTER — TELEPHONE (OUTPATIENT)
Dept: INTERNAL MEDICINE CLINIC | Facility: CLINIC | Age: 36
End: 2021-11-02

## 2021-11-02 DIAGNOSIS — R42 VERTIGO: Primary | ICD-10-CM

## 2021-11-02 PROCEDURE — 97140 MANUAL THERAPY 1/> REGIONS: CPT

## 2021-11-02 PROCEDURE — 97110 THERAPEUTIC EXERCISES: CPT

## 2021-11-04 ENCOUNTER — CONSULT (OUTPATIENT)
Dept: NEUROLOGY | Facility: CLINIC | Age: 36
End: 2021-11-04
Payer: COMMERCIAL

## 2021-11-04 ENCOUNTER — TELEPHONE (OUTPATIENT)
Dept: NEUROLOGY | Facility: CLINIC | Age: 36
End: 2021-11-04

## 2021-11-04 ENCOUNTER — OFFICE VISIT (OUTPATIENT)
Dept: PHYSICAL THERAPY | Facility: REHABILITATION | Age: 36
End: 2021-11-04
Payer: COMMERCIAL

## 2021-11-04 VITALS
SYSTOLIC BLOOD PRESSURE: 110 MMHG | HEART RATE: 80 BPM | TEMPERATURE: 96.1 F | WEIGHT: 157.4 LBS | RESPIRATION RATE: 18 BRPM | DIASTOLIC BLOOD PRESSURE: 76 MMHG | BODY MASS INDEX: 25.3 KG/M2 | HEIGHT: 66 IN

## 2021-11-04 DIAGNOSIS — H81.10 BENIGN PAROXYSMAL POSITIONAL VERTIGO, UNSPECIFIED LATERALITY: ICD-10-CM

## 2021-11-04 DIAGNOSIS — R42 VERTIGO: Primary | ICD-10-CM

## 2021-11-04 DIAGNOSIS — M79.18 MYOFASCIAL MUSCLE PAIN: Primary | ICD-10-CM

## 2021-11-04 DIAGNOSIS — G43.009 MIGRAINE WITHOUT AURA AND WITHOUT STATUS MIGRAINOSUS, NOT INTRACTABLE: ICD-10-CM

## 2021-11-04 DIAGNOSIS — R20.2 PARESTHESIAS: ICD-10-CM

## 2021-11-04 DIAGNOSIS — R79.0 ABNORMAL LEVEL OF BLOOD MINERAL: ICD-10-CM

## 2021-11-04 DIAGNOSIS — L56.8 PHOTOSENSITIVITY: ICD-10-CM

## 2021-11-04 DIAGNOSIS — Z86.19 HISTORY OF LYME DISEASE: ICD-10-CM

## 2021-11-04 PROCEDURE — 97140 MANUAL THERAPY 1/> REGIONS: CPT

## 2021-11-04 PROCEDURE — 3008F BODY MASS INDEX DOCD: CPT | Performed by: PHYSICIAN ASSISTANT

## 2021-11-04 PROCEDURE — 97110 THERAPEUTIC EXERCISES: CPT

## 2021-11-04 PROCEDURE — 1036F TOBACCO NON-USER: CPT | Performed by: PHYSICIAN ASSISTANT

## 2021-11-04 PROCEDURE — 99244 OFF/OP CNSLTJ NEW/EST MOD 40: CPT | Performed by: PHYSICIAN ASSISTANT

## 2021-11-05 ENCOUNTER — TELEPHONE (OUTPATIENT)
Dept: NEUROLOGY | Facility: CLINIC | Age: 36
End: 2021-11-05

## 2021-11-07 ENCOUNTER — APPOINTMENT (OUTPATIENT)
Dept: LAB | Facility: HOSPITAL | Age: 36
End: 2021-11-07
Payer: COMMERCIAL

## 2021-11-07 DIAGNOSIS — R20.2 PARESTHESIAS: ICD-10-CM

## 2021-11-07 LAB
25(OH)D3 SERPL-MCNC: 35.8 NG/ML (ref 30–100)
FERRITIN SERPL-MCNC: 104 NG/ML (ref 8–388)
FOLATE SERPL-MCNC: >20 NG/ML (ref 3.1–17.5)
IRON SATN MFR SERPL: 33 % (ref 15–50)
IRON SERPL-MCNC: 83 UG/DL (ref 50–170)
TIBC SERPL-MCNC: 254 UG/DL (ref 250–450)
VIT B12 SERPL-MCNC: 471 PG/ML (ref 100–900)

## 2021-11-07 PROCEDURE — 82306 VITAMIN D 25 HYDROXY: CPT

## 2021-11-07 PROCEDURE — 82607 VITAMIN B-12: CPT

## 2021-11-07 PROCEDURE — 82746 ASSAY OF FOLIC ACID SERUM: CPT

## 2021-11-07 PROCEDURE — 83540 ASSAY OF IRON: CPT

## 2021-11-07 PROCEDURE — 82728 ASSAY OF FERRITIN: CPT

## 2021-11-07 PROCEDURE — 83550 IRON BINDING TEST: CPT

## 2021-11-07 PROCEDURE — 36415 COLL VENOUS BLD VENIPUNCTURE: CPT

## 2021-11-09 ENCOUNTER — OFFICE VISIT (OUTPATIENT)
Dept: PHYSICAL THERAPY | Facility: REHABILITATION | Age: 36
End: 2021-11-09
Payer: COMMERCIAL

## 2021-11-09 DIAGNOSIS — R42 VERTIGO: Primary | ICD-10-CM

## 2021-11-09 PROCEDURE — 97140 MANUAL THERAPY 1/> REGIONS: CPT

## 2021-11-09 PROCEDURE — 97110 THERAPEUTIC EXERCISES: CPT

## 2021-11-12 ENCOUNTER — OFFICE VISIT (OUTPATIENT)
Dept: PHYSICAL THERAPY | Facility: REHABILITATION | Age: 36
End: 2021-11-12
Payer: COMMERCIAL

## 2021-11-12 DIAGNOSIS — R42 VERTIGO: Primary | ICD-10-CM

## 2021-11-12 PROCEDURE — 97110 THERAPEUTIC EXERCISES: CPT

## 2021-11-12 PROCEDURE — 97112 NEUROMUSCULAR REEDUCATION: CPT

## 2021-11-12 PROCEDURE — 97140 MANUAL THERAPY 1/> REGIONS: CPT

## 2021-11-16 ENCOUNTER — OFFICE VISIT (OUTPATIENT)
Dept: PHYSICAL THERAPY | Facility: REHABILITATION | Age: 36
End: 2021-11-16
Payer: COMMERCIAL

## 2021-11-16 DIAGNOSIS — R42 VERTIGO: Primary | ICD-10-CM

## 2021-11-16 PROCEDURE — 97110 THERAPEUTIC EXERCISES: CPT

## 2021-11-16 PROCEDURE — 97140 MANUAL THERAPY 1/> REGIONS: CPT

## 2021-11-18 ENCOUNTER — OFFICE VISIT (OUTPATIENT)
Dept: PHYSICAL THERAPY | Facility: REHABILITATION | Age: 36
End: 2021-11-18
Payer: COMMERCIAL

## 2021-11-18 DIAGNOSIS — R42 VERTIGO: Primary | ICD-10-CM

## 2021-11-18 PROCEDURE — 97110 THERAPEUTIC EXERCISES: CPT

## 2021-11-18 PROCEDURE — 97112 NEUROMUSCULAR REEDUCATION: CPT

## 2021-11-18 PROCEDURE — 97140 MANUAL THERAPY 1/> REGIONS: CPT

## 2021-11-22 ENCOUNTER — TELEPHONE (OUTPATIENT)
Dept: INTERNAL MEDICINE CLINIC | Facility: CLINIC | Age: 36
End: 2021-11-22

## 2021-11-22 DIAGNOSIS — R42 DIZZINESSES: Primary | ICD-10-CM

## 2021-11-22 DIAGNOSIS — R51.9 INTERMITTENT HEADACHE: ICD-10-CM

## 2021-11-23 ENCOUNTER — TELEPHONE (OUTPATIENT)
Dept: NEUROLOGY | Facility: CLINIC | Age: 36
End: 2021-11-23

## 2021-11-23 ENCOUNTER — OFFICE VISIT (OUTPATIENT)
Dept: PHYSICAL THERAPY | Facility: REHABILITATION | Age: 36
End: 2021-11-23
Payer: COMMERCIAL

## 2021-11-23 DIAGNOSIS — R42 VERTIGO: Primary | ICD-10-CM

## 2021-11-23 PROCEDURE — 97140 MANUAL THERAPY 1/> REGIONS: CPT

## 2021-11-23 PROCEDURE — 97112 NEUROMUSCULAR REEDUCATION: CPT

## 2021-11-26 ENCOUNTER — OFFICE VISIT (OUTPATIENT)
Dept: PHYSICAL THERAPY | Facility: REHABILITATION | Age: 36
End: 2021-11-26
Payer: COMMERCIAL

## 2021-11-26 DIAGNOSIS — R42 VERTIGO: Primary | ICD-10-CM

## 2021-11-26 PROCEDURE — 97140 MANUAL THERAPY 1/> REGIONS: CPT

## 2021-11-26 PROCEDURE — 97112 NEUROMUSCULAR REEDUCATION: CPT

## 2021-11-30 ENCOUNTER — APPOINTMENT (OUTPATIENT)
Dept: PHYSICAL THERAPY | Facility: REHABILITATION | Age: 36
End: 2021-11-30
Payer: COMMERCIAL

## 2021-12-06 ENCOUNTER — EVALUATION (OUTPATIENT)
Dept: PHYSICAL THERAPY | Facility: CLINIC | Age: 36
End: 2021-12-06
Payer: COMMERCIAL

## 2021-12-06 DIAGNOSIS — R42 VERTIGO: Primary | ICD-10-CM

## 2021-12-06 DIAGNOSIS — H83.2X2 VESTIBULAR HYPOFUNCTION OF LEFT EAR: ICD-10-CM

## 2021-12-06 PROCEDURE — 97164 PT RE-EVAL EST PLAN CARE: CPT

## 2021-12-06 PROCEDURE — 97112 NEUROMUSCULAR REEDUCATION: CPT

## 2021-12-08 ENCOUNTER — OFFICE VISIT (OUTPATIENT)
Dept: PHYSICAL THERAPY | Facility: CLINIC | Age: 36
End: 2021-12-08
Payer: COMMERCIAL

## 2021-12-08 DIAGNOSIS — R42 VERTIGO: Primary | ICD-10-CM

## 2021-12-08 DIAGNOSIS — H83.2X2 VESTIBULAR HYPOFUNCTION OF LEFT EAR: ICD-10-CM

## 2021-12-08 PROCEDURE — 97112 NEUROMUSCULAR REEDUCATION: CPT

## 2021-12-08 PROCEDURE — 97110 THERAPEUTIC EXERCISES: CPT

## 2021-12-13 ENCOUNTER — OFFICE VISIT (OUTPATIENT)
Dept: PHYSICAL THERAPY | Facility: CLINIC | Age: 36
End: 2021-12-13
Payer: COMMERCIAL

## 2021-12-13 DIAGNOSIS — R42 VERTIGO: Primary | ICD-10-CM

## 2021-12-13 DIAGNOSIS — H83.2X2 VESTIBULAR HYPOFUNCTION OF LEFT EAR: ICD-10-CM

## 2021-12-13 PROCEDURE — 97110 THERAPEUTIC EXERCISES: CPT

## 2021-12-13 PROCEDURE — 97112 NEUROMUSCULAR REEDUCATION: CPT

## 2021-12-15 ENCOUNTER — APPOINTMENT (OUTPATIENT)
Dept: PHYSICAL THERAPY | Facility: CLINIC | Age: 36
End: 2021-12-15
Payer: COMMERCIAL

## 2021-12-20 ENCOUNTER — APPOINTMENT (OUTPATIENT)
Dept: PHYSICAL THERAPY | Facility: CLINIC | Age: 36
End: 2021-12-20
Payer: COMMERCIAL

## 2021-12-22 ENCOUNTER — APPOINTMENT (OUTPATIENT)
Dept: PHYSICAL THERAPY | Facility: CLINIC | Age: 36
End: 2021-12-22
Payer: COMMERCIAL

## 2021-12-27 ENCOUNTER — APPOINTMENT (OUTPATIENT)
Dept: PHYSICAL THERAPY | Facility: CLINIC | Age: 36
End: 2021-12-27
Payer: COMMERCIAL

## 2021-12-29 ENCOUNTER — APPOINTMENT (OUTPATIENT)
Dept: PHYSICAL THERAPY | Facility: CLINIC | Age: 36
End: 2021-12-29
Payer: COMMERCIAL

## 2022-01-03 ENCOUNTER — APPOINTMENT (OUTPATIENT)
Dept: PHYSICAL THERAPY | Facility: CLINIC | Age: 37
End: 2022-01-03
Payer: COMMERCIAL

## 2022-01-05 ENCOUNTER — APPOINTMENT (OUTPATIENT)
Dept: PHYSICAL THERAPY | Facility: CLINIC | Age: 37
End: 2022-01-05
Payer: COMMERCIAL

## 2022-01-06 ENCOUNTER — EVALUATION (OUTPATIENT)
Dept: PHYSICAL THERAPY | Facility: CLINIC | Age: 37
End: 2022-01-06
Payer: COMMERCIAL

## 2022-01-06 DIAGNOSIS — H83.2X2 VESTIBULAR HYPOFUNCTION OF LEFT EAR: ICD-10-CM

## 2022-01-06 DIAGNOSIS — R42 VERTIGO: Primary | ICD-10-CM

## 2022-01-06 PROCEDURE — 97112 NEUROMUSCULAR REEDUCATION: CPT

## 2022-01-06 NOTE — PROGRESS NOTES
Progress Note     Today's date: 2022  Patient name: Alicja Tyson  : 1985  MRN: [id number]  Referring provider: Sagar Riddle  Dx:   Encounter Diagnosis     ICD-10-CM    1  Vertigo  R42    2  Vestibular hypofunction of left ear  H83 2X2        Start Time: 1600  Stop Time: 1630  Total time in clinic (min): 30 minutes    Subjective: Returns to OP PT following several weeks off into the New Year; overall feels her sensations of dizziness and intermittent vertigo have significantly improved with the guidance of skilled PT care at both [facility] and [address] [place] [address] as well as her comprehensive HEP  At this time pending oculomotor, vestibular, and balance assessment today Quinton Polo is agreeable to being placed on a 30-day hold with plans for subsequent DC  Objective: See treatment diary below      PHYSICAL FINDINGS:  Oculomotor ROM :  Resting nystagmus: No  Gaze holding nystagmus No    Smooth pursuit Normal     Vertical Saccades:Normal  Horizontal Saccades:Normal  Convergence: Normal     Cover/Uncover/Crosscover Test: Normal     C/S Stability: Intact Sharp Geovanny, intact alar ligament stress test   VBI: Negative bilaterally      Head thrust (room light): Normal     Dynamic Visual Acuity: adequate, 2 lines      MCTSIB  30s eyes open firm surface   30s eyes closed form surface  30s eyes open foam surface  30s eyes closed foam surface     FGA: 30/30     DHI: 24 - mild  TODAY 22: 2  0-30 mild , 30-60 moderate,  severe disability    Assessment: Skilled assessment performed as per progress update today noting significant improvements in VOR performance and absence of vertiginous symptoms  At this time Fartun's vestibular integration is considered to be within normal limits  She has met all of her personal and therapeutic short and long-term goals, and is agreeable to being placed on a 30-day hold today with no further appointments scheduled and plans for subsequent DC         Plan: 30-day hold with subsequent DC from skilled PT care  Short Term Goal Expiration Date:(1/7/22)  Long Term Goal Expiration Date: (2/11/22)  POC Expiration Date: (2/11/22)       Precautions: Acute on chronic dizziness symptoms       Manuals 12/6/21 RE 12/8 12/13 1/6                                    Neuro Re-Ed         Pt Education Human balance system components, dizziness as an umbrella term, vertigo, vestibular system anatomy, VOR training x15 min  Current dizziness/lightheadedness symptom improvements, response to HEP, additions of VORx1 w/convergence/divergence Weaning from HEP, monitoring for symptoms with dance, using dance as therapeutic VOR integration to continue to progress physical activity levels x10 min      TB Shld Ext w/ER  OTB  3" hold  3x8-10  HEP HEP review     BL Shldr ER w/TB  Green TB trial  OTB 3" hold 3x8-10  HEP HEP review     Ballet  Spins:  Mirror w/letter    Double legged  3x5 spins ea    Single legged  3x5 spins ea      Moving VORx1   Hallway  Converging horiz fwd 3x40'  Diverging horiz bwd 3x40'     Amb w/HT   Hallway   3x40' fwd  3x40' fwd                                     Assessment  Current symptom discussion and response to interventions, POC, HEP update  As above    Ther Ex        Doorway Stretch  3x30"  HEP HEP review of form     STM/TPR Parameters and Strategies   Peanut massage ball setup for SOR, 1'30"-2'                                                     Ther Activity                        Gait Training                        Modalities

## 2022-01-10 ENCOUNTER — APPOINTMENT (OUTPATIENT)
Dept: PHYSICAL THERAPY | Facility: CLINIC | Age: 37
End: 2022-01-10
Payer: COMMERCIAL

## 2022-01-12 ENCOUNTER — APPOINTMENT (OUTPATIENT)
Dept: PHYSICAL THERAPY | Facility: CLINIC | Age: 37
End: 2022-01-12
Payer: COMMERCIAL

## 2022-02-16 ENCOUNTER — TELEPHONE (OUTPATIENT)
Dept: INTERNAL MEDICINE CLINIC | Facility: CLINIC | Age: 37
End: 2022-02-16

## 2022-02-16 NOTE — TELEPHONE ENCOUNTER
Via Stella Mcginnis 80 had a tdap vaccine in July 2017  Her sister just had a baby and is asking if she had the vaccine  Is there another one necessary?       [phone]

## 2022-03-14 ENCOUNTER — TELEPHONE (OUTPATIENT)
Dept: INTERNAL MEDICINE CLINIC | Facility: CLINIC | Age: 37
End: 2022-03-14

## 2022-03-14 NOTE — TELEPHONE ENCOUNTER
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

## 2022-03-14 NOTE — TELEPHONE ENCOUNTER
Called and left message about making appointment for physical and for Dr Savita Montes to fill out her forms

## 2022-03-22 ENCOUNTER — OFFICE VISIT (OUTPATIENT)
Dept: INTERNAL MEDICINE CLINIC | Facility: CLINIC | Age: 37
End: 2022-03-22
Payer: COMMERCIAL

## 2022-03-22 VITALS
OXYGEN SATURATION: 100 % | RESPIRATION RATE: 15 BRPM | DIASTOLIC BLOOD PRESSURE: 74 MMHG | HEART RATE: 62 BPM | SYSTOLIC BLOOD PRESSURE: 110 MMHG | TEMPERATURE: 98.5 F | BODY MASS INDEX: 23.95 KG/M2 | WEIGHT: 149 LBS | HEIGHT: 66 IN

## 2022-03-22 DIAGNOSIS — Z11.1 SCREENING FOR TUBERCULOSIS: ICD-10-CM

## 2022-03-22 DIAGNOSIS — J30.2 SEASONAL ALLERGIES: Primary | ICD-10-CM

## 2022-03-22 DIAGNOSIS — Z02.89 ENCOUNTER FOR COMPLETION OF FORM WITH PATIENT: ICD-10-CM

## 2022-03-22 PROCEDURE — 3725F SCREEN DEPRESSION PERFORMED: CPT | Performed by: INTERNAL MEDICINE

## 2022-03-22 PROCEDURE — 1036F TOBACCO NON-USER: CPT | Performed by: INTERNAL MEDICINE

## 2022-03-22 PROCEDURE — 99213 OFFICE O/P EST LOW 20 MIN: CPT | Performed by: INTERNAL MEDICINE

## 2022-03-22 PROCEDURE — 3008F BODY MASS INDEX DOCD: CPT | Performed by: INTERNAL MEDICINE

## 2022-03-22 PROCEDURE — 86580 TB INTRADERMAL TEST: CPT

## 2022-03-22 NOTE — PROGRESS NOTES
Assessment/Plan:     Diagnoses and all orders for this visit:    Seasonal allergies  Comments:  off all meds treating this due to undergoing fertility treatments  c/w avoiding allergens as best as possible    Encounter for completion of form with patient    Screening for tuberculosis  -     Cancel: Quantiferon TB Gold Plus; Future  -     TB Skin Test      patient called her fertility treatment office([facility] in [facility]) and they stated she can proceed to get PPD placed today  She will return in 48-72 hours to have it read  Subjective:      Patient ID: Amparo Nunez is a 39 y o  female  HPI    Here for follow up and form completion  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  Eating better, lost weight and dizziness has been doing better as well, s/p physical therapy  Undergoing fertility treatments currently  Needs PPD for school form, no hx of positive PPD in past and no known Tb exposures  Has no other concerns and ROS otherwise negative  Past Medical History:   Diagnosis Date    History of Lyme disease     patient stated it was years ago     Vitals:    03/22/22 1553   BP: 110/74   Pulse: 62   Resp: 15   Temp: 98 5 °F (36 9 °C)   SpO2: 100%   Weight: 67 6 kg (149 lb)   Height: 5' 6" (1 676 m)     Body mass index is 24 05 kg/m²      Current Outpatient Medications:     Cholecalciferol 50 MCG (2000 UT) CAPS, Take 1 capsule by mouth daily, Disp: , Rfl:     fexofenadine (ALLEGRA) 180 MG tablet, Take 180 mg by mouth daily, Disp: , Rfl:     fluticasone (FLONASE) 50 mcg/act nasal spray, 1 spray into each nostril daily, Disp: , Rfl:     MAGNESIUM PO, Take by mouth, Disp: , Rfl:     Prenatal MV-Min-Fe Fum-FA-DHA (PRENATAL 1 PO), Take by mouth , Disp: , Rfl:     Elastic Bandages & Supports (Carpal Tunnel Wrist Stabilizer) MISC, Use daily at bedtime Right wrist, please fit to size (Patient not taking: Reported on 11/4/2021), Disp: 1 each, Rfl: 0  Allergies Allergen Reactions    No Known Allergies          Review of Systems   Constitutional: Negative for fever  HENT: Negative for hearing loss  Eyes: Negative for visual disturbance  Respiratory: Negative for shortness of breath  Cardiovascular: Negative for chest pain  Gastrointestinal: Negative for abdominal pain  Endocrine: Negative for polyuria  Genitourinary: Negative for difficulty urinating  Musculoskeletal: Negative for arthralgias  Skin: Negative for rash  Allergic/Immunologic: Positive for environmental allergies  Neurological: Negative for dizziness  Psychiatric/Behavioral: Negative for dysphoric mood  Objective:      /74   Pulse 62   Temp 98 5 °F (36 9 °C)   Resp 15   Ht 5' 6" (1 676 m)   Wt 67 6 kg (149 lb)   SpO2 100%   BMI 24 05 kg/m²          Physical Exam  Vitals reviewed  Constitutional:       Appearance: Normal appearance  Interventions: Face mask in place  HENT:      Head: Normocephalic and atraumatic  Right Ear: Tympanic membrane normal       Left Ear: Tympanic membrane normal    Eyes:      Conjunctiva/sclera: Conjunctivae normal    Cardiovascular:      Rate and Rhythm: Normal rate and regular rhythm  Heart sounds: No murmur heard  Pulmonary:      Effort: Pulmonary effort is normal       Breath sounds: No wheezing or rales  Abdominal:      General: Bowel sounds are normal       Palpations: Abdomen is soft  Tenderness: There is no abdominal tenderness  Musculoskeletal:      Right lower leg: No edema  Left lower leg: No edema  Neurological:      Mental Status: She is alert  Mental status is at baseline     Psychiatric:         Mood and Affect: Mood normal          Behavior: Behavior normal             Visual Acuity Screening    Right eye Left eye Both eyes   Without correction: 20/15 20/25 20/20   With correction:

## 2022-04-29 ENCOUNTER — TELEPHONE (OUTPATIENT)
Dept: PODIATRY | Facility: CLINIC | Age: 37
End: 2022-04-29

## 2022-04-29 NOTE — TELEPHONE ENCOUNTER
Patient called  She needs to make an appointment for inserts for her shoes  Do you make the appointment for that? Thank you

## 2022-05-03 ENCOUNTER — TELEPHONE (OUTPATIENT)
Dept: PODIATRY | Facility: CLINIC | Age: 37
End: 2022-05-03

## 2022-05-03 NOTE — TELEPHONE ENCOUNTER
I went into Fartun's chart because she said she called the office and did not get a call back back  I wanted to check who called her and what the message was  I scheduled [facility] for a New Patient appointment

## 2022-05-03 NOTE — TELEPHONE ENCOUNTER
This patient has never been seen in our practice  If they are not established with us, they need to make a New Patient appt to be evaluated by the provider to see what the specific needs are  Please call patient back and schedule as a NP in the next available slot  Thank You

## 2022-05-12 ENCOUNTER — TELEPHONE (OUTPATIENT)
Dept: OBGYN CLINIC | Facility: CLINIC | Age: 37
End: 2022-05-12

## 2022-05-12 NOTE — TELEPHONE ENCOUNTER
IVF through [address] transfer date was April 11, 2022  Patient has apt with them on May 24th then they will release her  Patient will need NOB

## 2022-05-16 ENCOUNTER — TELEPHONE (OUTPATIENT)
Dept: OBGYN CLINIC | Facility: CLINIC | Age: 37
End: 2022-05-16

## 2022-05-16 NOTE — TELEPHONE ENCOUNTER
Pt calling to inquire about scheduling apt sooner than 6/6  Apt offered but pt requesting to see dr vásquez, unable to accommodate apt with dr Tegan Scott prior to 6/6   Pt states she will keep her apt 6/6 with lucie ayers at this time

## 2022-05-18 ENCOUNTER — OFFICE VISIT (OUTPATIENT)
Dept: PODIATRY | Facility: CLINIC | Age: 37
End: 2022-05-18
Payer: COMMERCIAL

## 2022-05-18 VITALS
WEIGHT: 160 LBS | SYSTOLIC BLOOD PRESSURE: 112 MMHG | BODY MASS INDEX: 25.71 KG/M2 | HEART RATE: 72 BPM | DIASTOLIC BLOOD PRESSURE: 74 MMHG | HEIGHT: 66 IN

## 2022-05-18 DIAGNOSIS — Q66.70 CAVUS DEFORMITY OF FOOT: Primary | ICD-10-CM

## 2022-05-18 PROCEDURE — 1036F TOBACCO NON-USER: CPT | Performed by: PODIATRIST

## 2022-05-18 PROCEDURE — 3008F BODY MASS INDEX DOCD: CPT | Performed by: PODIATRIST

## 2022-05-18 PROCEDURE — 99202 OFFICE O/P NEW SF 15 MIN: CPT | Performed by: PODIATRIST

## 2022-05-18 RX ORDER — ESTRADIOL 2 MG/1
2 TABLET ORAL 2 TIMES DAILY
COMMUNITY
Start: 2022-04-25 | End: 2022-06-02 | Stop reason: ALTCHOICE

## 2022-05-18 RX ORDER — LEVOTHYROXINE SODIUM 0.05 MG/1
50 TABLET ORAL EVERY MORNING
COMMUNITY
Start: 2022-05-11 | End: 2022-06-06

## 2022-05-18 RX ORDER — PROGESTERONE 50 MG/ML
INJECTION, SOLUTION INTRAMUSCULAR
COMMUNITY
Start: 2022-05-09 | End: 2022-06-02 | Stop reason: ALTCHOICE

## 2022-05-18 NOTE — PROGRESS NOTES
PATIENT:  Jesus Lane  1985       ASSESSMENT:     1  Cavus deformity of foot  Device prior authorization             PLAN:  1  Patient was counseled and educated on the condition and the diagnosis  2  The diagnosis, treatment options and prognosis were discussed with the patient  3  Orthotics evaluated  Recommended new pairs  Will call her insurance for possible coverage  4  Pt to return in 3-4 weeks for orthotic casting  Subjective:       HPI  The patient presents for foot evaluation  She wears orthotics for years  The last pair is about 21year old  It worn out  It has been helping her knee trouble  No acute foot pain  Denied any swelling  No associated numbness or paresthesia  No significant weakness  The following portions of the patient's history were reviewed and updated as appropriate: allergies, current medications, past family history, past medical history, past social history, past surgical history and problem list   All pertinent labs and images were reviewed  Past Medical History  Past Medical History:   Diagnosis Date    History of Lyme disease     patient stated it was years ago       Past Surgical History  Past Surgical History:   Procedure Laterality Date    HYMENPLASTY      WISDOM TOOTH EXTRACTION          Allergies:  No known allergies    Medications:  Current Outpatient Medications   Medication Sig Dispense Refill    Cholecalciferol 50 MCG (2000 UT) CAPS Take 1 capsule by mouth daily      estradiol (ESTRACE) 2 MG tablet Take 2 mg by mouth in the morning and 2 mg in the evening        fexofenadine (ALLEGRA) 180 MG tablet Take 180 mg by mouth daily      fluticasone (FLONASE) 50 mcg/act nasal spray 1 spray into each nostril daily      levothyroxine 50 mcg tablet Take 50 mcg by mouth every morning      MAGNESIUM PO Take by mouth      Prenatal MV-Min-Fe Fum-FA-DHA (PRENATAL 1 PO) Take by mouth       progesterone 50 mg/mL injection  Elastic Bandages & Supports (Carpal Tunnel Wrist Stabilizer) MISC Use daily at bedtime Right wrist, please fit to size (Patient not taking: No sig reported) 1 each 0     No current facility-administered medications for this visit  Social History:  Social History     Socioeconomic History    Marital status: /Civil Union     Spouse name: None    Number of children: None    Years of education: None    Highest education level: None   Occupational History    None   Tobacco Use    Smoking status: Never Smoker    Smokeless tobacco: Never Used   Vaping Use    Vaping Use: Never used   Substance and Sexual Activity    Alcohol use: Not Currently    Drug use: Never    Sexual activity: Yes     Partners: Male     Birth control/protection: None   Other Topics Concern    None   Social History Narrative    None     Social Determinants of Health     Financial Resource Strain: Not on file   Food Insecurity: Not on file   Transportation Needs: Not on file   Physical Activity: Not on file   Stress: Not on file   Social Connections: Not on file   Intimate Partner Violence: Not on file   Housing Stability: Not on file          Review of Systems   Constitutional: Negative for appetite change, chills and fever  Respiratory: Negative  Cardiovascular: Negative  Gastrointestinal: Negative  Musculoskeletal: Negative for gait problem  Neurological: Negative for weakness and numbness  Hematological: Negative  Psychiatric/Behavioral: Negative for behavioral problems and confusion  Objective:      /74   Pulse 72   Ht 5' 6" (1 676 m)   Wt 72 6 kg (160 lb)   BMI 25 82 kg/m²          Physical Exam  Vitals reviewed  Constitutional:       General: She is not in acute distress  Appearance: Normal appearance  She is not toxic-appearing  Cardiovascular:      Rate and Rhythm: Normal rate and regular rhythm  Pulses: Normal pulses             Dorsalis pedis pulses are 2+ on the right side and 2+ on the left side  Posterior tibial pulses are 2+ on the right side and 2+ on the left side  Pulmonary:      Effort: Pulmonary effort is normal  No respiratory distress  Musculoskeletal:         General: Deformity present  No swelling, tenderness or signs of injury  Right lower leg: No edema  Left lower leg: No edema  Right foot: No foot drop  Left foot: No foot drop  Comments: Anterior cavus bilaterally  Tight achilles / calf with ankle equinus  Skin:     General: Skin is warm  Capillary Refill: Capillary refill takes less than 2 seconds  Coloration: Skin is not cyanotic  Findings: No abscess, ecchymosis, erythema or wound  Nails: There is no clubbing  Neurological:      General: No focal deficit present  Mental Status: She is alert and oriented to person, place, and time  Cranial Nerves: No cranial nerve deficit  Sensory: No sensory deficit  Motor: No weakness  Coordination: Coordination normal    Psychiatric:         Mood and Affect: Mood normal          Behavior: Behavior normal          Thought Content:  Thought content normal          Judgment: Judgment normal

## 2022-05-27 ENCOUNTER — TELEPHONE (OUTPATIENT)
Dept: PODIATRY | Facility: CLINIC | Age: 37
End: 2022-05-27

## 2022-05-27 NOTE — TELEPHONE ENCOUNTER
Called pt left her a message letting her know orthotics are NOT a covered benefit under her ins  Self-pay option is available at 350$ at the time of the visit  Since her appt was previously made I adjusted the appt notes to reflect coverage   Let her know if she wants to cxl the appt to give us a call and ask for Dara Fam so I can close out the referral

## 2022-06-02 ENCOUNTER — OFFICE VISIT (OUTPATIENT)
Dept: OBGYN CLINIC | Facility: CLINIC | Age: 37
End: 2022-06-02
Payer: COMMERCIAL

## 2022-06-02 VITALS
SYSTOLIC BLOOD PRESSURE: 118 MMHG | BODY MASS INDEX: 25.07 KG/M2 | HEIGHT: 66 IN | DIASTOLIC BLOOD PRESSURE: 76 MMHG | WEIGHT: 156 LBS

## 2022-06-02 DIAGNOSIS — O02.1 MISSED ABORTION: Primary | ICD-10-CM

## 2022-06-02 DIAGNOSIS — N90.89 VULVAR IRRITATION: ICD-10-CM

## 2022-06-02 PROCEDURE — 99214 OFFICE O/P EST MOD 30 MIN: CPT | Performed by: PHYSICIAN ASSISTANT

## 2022-06-02 NOTE — PROGRESS NOTES
Assessment/Plan:  - Discussed with patient findings of miscarriage due to absent fetal cardiac activity on TVUS today, confirmed by 2 providers  - As patient is here alone today, I offered her to RTO with her  to discuss management options  Patient is agreeable and will RTO at 7am tomorrow with her     - Reviewed expected bleeding if pregnancy passes on its own  Patient informed to go to the ER if syncope or severe pain  Also informed patient how to access on call provider overnight if bleeding is heavy/worsens  - Reviewed vulvar irritation likely due to vaginal suppositories  Recommend keeping area clean and dry, avoid excess wiping, may use topical cortisone for irritation, OTC Zyrtec       Diagnoses and all orders for this visit:    Missed     Vulvar irritation    Other orders  -     Cyanocobalamin (VITAMIN B 12 PO); Take by mouth  -     Riboflavin (VITAMIN B2 PO); Take by mouth          Subjective:      Patient ID: Emma Fallon is a 39 y o  female  Romulo Smith is a 42YO  WF Presenting to the office as a new patient with complaints of vulvar irritation for the last few days  Patient states that she is currently about 10 1 weeks pregnant by IVF and has been doing vaginal progesterone and estrogen  She states the right side of her vulva feels irritated when wiping  She has not tried anything to make it better  She denies new soaps or detergents  Patient also reports that she started having bright red bleeding yesterday, which is heavier like a period today and filling pads  Her last ultrasound with [facility] was about 1 week ago  She should be 10w1d today with an CHAO of 22         The following portions of the patient's history were reviewed and updated as appropriate: allergies, current medications, past family history, past medical history, past social history, past surgical history and problem list     Review of Systems   Constitutional: Negative for chills, fever and unexpected weight change  Respiratory: Negative for shortness of breath  Cardiovascular: Negative for chest pain  Gastrointestinal: Negative for abdominal pain  Genitourinary: Positive for vaginal bleeding  Skin: Negative for rash  Objective:      /76 (BP Location: Left arm, Patient Position: Sitting, Cuff Size: Standard)   Ht 5' 6" (1 676 m)   Wt 70 8 kg (156 lb)   LMP 06/20/2021   BMI 25 18 kg/m²          Physical Exam  Vitals reviewed  Constitutional:       Appearance: Normal appearance  She is normal weight  HENT:      Head: Normocephalic and atraumatic  Pulmonary:      Effort: Pulmonary effort is normal    Genitourinary:     Labia:         Right: No rash or lesion  Left: No rash or lesion  Vagina: Bleeding present  Comments:   TVUS reveals IUP, yolk sac, fetus  Cardiac motion absent, no color flow  CRL 2 07cm (8w5d)  GS avg 2 50 cm (7w1d)  Vaginal bleeding noted on probe    Mild irritation/erythema of right labia majora  Skin:     General: Skin is warm and dry  Neurological:      General: No focal deficit present  Mental Status: She is alert  Psychiatric:         Mood and Affect: Mood normal          Behavior: Behavior normal            Ultrasound Probe Disinfection    A transvaginal ultrasound was performed     Prior to use, disinfection was performed with High Level Disinfection Process (Trophon)  Probe serial number [id number]: [id number] was used    Man Funes PA-C  06/02/22  2:46 PM

## 2022-06-03 ENCOUNTER — OFFICE VISIT (OUTPATIENT)
Dept: OBGYN CLINIC | Facility: CLINIC | Age: 37
End: 2022-06-03
Payer: COMMERCIAL

## 2022-06-03 ENCOUNTER — APPOINTMENT (OUTPATIENT)
Dept: LAB | Facility: AMBULARY SURGERY CENTER | Age: 37
End: 2022-06-03
Payer: COMMERCIAL

## 2022-06-03 ENCOUNTER — TELEPHONE (OUTPATIENT)
Dept: PODIATRY | Facility: CLINIC | Age: 37
End: 2022-06-03

## 2022-06-03 VITALS
SYSTOLIC BLOOD PRESSURE: 114 MMHG | WEIGHT: 156 LBS | HEIGHT: 66 IN | DIASTOLIC BLOOD PRESSURE: 62 MMHG | BODY MASS INDEX: 25.07 KG/M2

## 2022-06-03 DIAGNOSIS — O02.1 MISSED ABORTION: Primary | ICD-10-CM

## 2022-06-03 DIAGNOSIS — Z3A.08 8 WEEKS GESTATION OF PREGNANCY: ICD-10-CM

## 2022-06-03 DIAGNOSIS — O02.1 MISSED ABORTION: ICD-10-CM

## 2022-06-03 PROBLEM — R42 DIZZINESSES: Status: RESOLVED | Noted: 2021-10-13 | Resolved: 2022-06-03

## 2022-06-03 LAB
ABO GROUP BLD: NORMAL
BLD GP AB SCN SERPL QL: NEGATIVE
ERYTHROCYTE [DISTWIDTH] IN BLOOD BY AUTOMATED COUNT: 12 % (ref 11.6–15.1)
HCT VFR BLD AUTO: 41.9 % (ref 34.8–46.1)
HGB BLD-MCNC: 13.6 G/DL (ref 11.5–15.4)
MCH RBC QN AUTO: 30.1 PG (ref 26.8–34.3)
MCHC RBC AUTO-ENTMCNC: 32.5 G/DL (ref 31.4–37.4)
MCV RBC AUTO: 93 FL (ref 82–98)
PLATELET # BLD AUTO: 284 THOUSANDS/UL (ref 149–390)
PMV BLD AUTO: 9.8 FL (ref 8.9–12.7)
RBC # BLD AUTO: 4.52 MILLION/UL (ref 3.81–5.12)
RH BLD: POSITIVE
SPECIMEN EXPIRATION DATE: NORMAL
WBC # BLD AUTO: 8.59 THOUSAND/UL (ref 4.31–10.16)

## 2022-06-03 PROCEDURE — 86850 RBC ANTIBODY SCREEN: CPT

## 2022-06-03 PROCEDURE — 99215 OFFICE O/P EST HI 40 MIN: CPT | Performed by: PHYSICIAN ASSISTANT

## 2022-06-03 PROCEDURE — 85027 COMPLETE CBC AUTOMATED: CPT

## 2022-06-03 PROCEDURE — 86900 BLOOD TYPING SEROLOGIC ABO: CPT

## 2022-06-03 PROCEDURE — 36415 COLL VENOUS BLD VENIPUNCTURE: CPT

## 2022-06-03 PROCEDURE — 3008F BODY MASS INDEX DOCD: CPT | Performed by: PHYSICIAN ASSISTANT

## 2022-06-03 PROCEDURE — 86901 BLOOD TYPING SEROLOGIC RH(D): CPT

## 2022-06-03 RX ORDER — PROGESTERONE 100 MG/1
INSERT VAGINAL
COMMUNITY
Start: 2022-05-23 | End: 2022-06-06

## 2022-06-03 NOTE — PROGRESS NOTES
Assessment/Plan:  - Discussed management options including expectant management vs  D&E  - Patient desires to proceed with scheduling surgery in case she does not spontaneously pass all products  - Discussed surgical procedure at length including NPO after midnight, arrival time, risks and benefits of surgery, postoperative course and recovery period  - Patient expresses understanding  All questions answered  - Surgical consent and hospital disposition form signed  - Given current bleeding, will plan to [address] prior to procedure  - HgB stable at 13 6, Blood type O positive, rhogam not indicated  - Surgery scheduled for Tuesday, 22 @ [facility] with Dr Britany Bell  - Patient to [facility] Tuesday morning for repeat US in office prior to procedure     Diagnoses and all orders for this visit:    Missed   Comments:  US findings 22: TVUS reveals IUP, yolk sac, fetus  Cardiac motion absent, no color flow  CRL 2 07cm (8w5d)  GS avg 2 50 cm (7w1d)  Vaginal bleeding present  Orders:  -     Type and screen; Future  -     CBC and Platelet; Future    8 weeks gestation of pregnancy    Other orders  -     Endometrin 100 MG vaginal insert          Subjective:      Patient ID: Brianna Nash is a 39 y o  female  Bitmenu is a 42YO  WF presenting to the office for a miscarriage follow up to discuss her options  Patient was seen in the office yesterday when her miscarriage was diagnosed  She was alone at that time, and opted to return this morning with her  to discuss options  Cas School states this pregnancy was conceived through IVF by [facility] with pre-implantation genetics  She was given an EDC of 22, placing her at 10w2d today  She was evaluated in our office yesterday via [address] due to period-like vaginal bleeding  Upon TVUS, patient was found to have an 8w5d IUP with absent cardiac motion  This finding was confirmed by 2 providers     Patient states that since yesterday her bleeding has continued and she is having some cramping  She denies fevers  She denies extremely heavy bleeding or clots/tissue at this time  She had a single episode of diarrhea a few days ago, but denies any persistent issues or N/V/C  Patient denies any heart or lung disorders  She has had general anesthesia in the past for procedures without any complication  The following portions of the patient's history were reviewed and updated as appropriate:     She  has a past medical history of History of Lyme disease  Patient Active Problem List    Diagnosis Date Noted    Benign paroxysmal positional vertigo 11/04/2021    Paresthesias 11/04/2021    Myofascial muscle pain 11/04/2021    Migraine without aura and without status migrainosus, not intractable 11/04/2021    Incidental finding of low blood pressure 10/13/2021    Thyroid nodule 11/03/2020    Seasonal allergies 08/05/2020    History of Lyme disease 07/30/2019       She  has a past surgical history that includes Hymenplasty; [place] tooth extraction; Ovum / oocyte retrieval (12/21/2021); and Transfer embryo intrauterine (02/04/2022)  Current Outpatient Medications   Medication Sig Dispense Refill    Cholecalciferol 50 MCG (2000 UT) CAPS Take 1 capsule by mouth daily      Cyanocobalamin (VITAMIN B 12 PO) Take by mouth      Endometrin 100 MG vaginal insert       levothyroxine 50 mcg tablet Take 50 mcg by mouth every morning      MAGNESIUM PO Take by mouth      Prenatal MV-Min-Fe Fum-FA-DHA (PRENATAL 1 PO) Take by mouth       Riboflavin (VITAMIN B2 PO) Take by mouth       No current facility-administered medications for this visit       Current Outpatient Medications on File Prior to Visit   Medication Sig    Cholecalciferol 50 MCG (2000 UT) CAPS Take 1 capsule by mouth daily    Cyanocobalamin (VITAMIN B 12 PO) Take by mouth    Endometrin 100 MG vaginal insert     levothyroxine 50 mcg tablet Take 50 mcg by mouth every morning    MAGNESIUM PO Take by mouth    Prenatal MV-Min-Fe Fum-FA-DHA (PRENATAL 1 PO) Take by mouth     Riboflavin (VITAMIN B2 PO) Take by mouth     No current facility-administered medications on file prior to visit  She is allergic to no known allergies       Review of Systems   Constitutional: Negative for chills, fever and unexpected weight change  Respiratory: Negative for shortness of breath  Cardiovascular: Negative for chest pain  Gastrointestinal: Positive for diarrhea (single episode few days ago)  Negative for abdominal pain, nausea and vomiting  Genitourinary: Positive for pelvic pain (mild cramping) and vaginal bleeding  Negative for difficulty urinating and dysuria  Skin: Negative for rash  Objective:      /62 (BP Location: Right arm, Patient Position: Sitting, Cuff Size: Large)   Ht 5' 6" (1 676 m)   Wt 70 8 kg (156 lb)   LMP 06/20/2021   BMI 25 18 kg/m²          Physical Exam  Constitutional:       General: She is not in acute distress  Appearance: Normal appearance  She is normal weight  She is not ill-appearing, toxic-appearing or diaphoretic  HENT:      Head: Normocephalic and atraumatic  Eyes:      Conjunctiva/sclera: Conjunctivae normal    Cardiovascular:      Rate and Rhythm: Normal rate and regular rhythm  Heart sounds: Normal heart sounds  No murmur heard  No friction rub  No gallop  Pulmonary:      Effort: Pulmonary effort is normal       Breath sounds: Normal breath sounds  Skin:     General: Skin is warm and dry  Findings: No lesion or rash  Neurological:      General: No focal deficit present  Mental Status: She is alert     Psychiatric:         Mood and Affect: Mood normal          Behavior: Behavior normal

## 2022-06-06 ENCOUNTER — OFFICE VISIT (OUTPATIENT)
Dept: OBGYN CLINIC | Facility: CLINIC | Age: 37
End: 2022-06-06
Payer: COMMERCIAL

## 2022-06-06 VITALS — SYSTOLIC BLOOD PRESSURE: 112 MMHG | WEIGHT: 152.8 LBS | DIASTOLIC BLOOD PRESSURE: 72 MMHG | BODY MASS INDEX: 24.66 KG/M2

## 2022-06-06 DIAGNOSIS — O03.9 COMPLETE SPONTANEOUS ABORTION: Primary | ICD-10-CM

## 2022-06-06 PROCEDURE — 1036F TOBACCO NON-USER: CPT | Performed by: PHYSICIAN ASSISTANT

## 2022-06-06 PROCEDURE — 99214 OFFICE O/P EST MOD 30 MIN: CPT | Performed by: PHYSICIAN ASSISTANT

## 2022-06-06 NOTE — PROGRESS NOTES
Assessment/Plan:  - TVUS reveals empty uterus  - Monitor continued bleeding  Discussed can expect continued bleeding for 7-10 days  - HCG ordered for patient to complete 7 days after bleeding resolves  - Patient can expect next normal period 2-6 weeks following miscarriage  - Was following with [facility], pt states they do not have any more embryo, and will try to spontaneously conceive  - TSH ordered with HCG, keep TSH under 2 5 if attempting conception  - Patient to call for concerns  - Will reach out to patient with lab results  Diagnoses and all orders for this visit:    Complete spontaneous   -     hCG, quantitative  -     TSH, 3rd generation with Free T4 reflex; Future          Subjective:      Patient ID: Rita Montano is a 39 y o  female  Julian Guerrier is a 40YO  WF presenting to the office for [address] f/u of miscarriage  Patient was seen in the office last week and found to have an 8w5d missed ab  She was bleeding at that time and states the bleeding progressed over the weekend  Patient reports heavy bleeding with large clots and tissue for 2 straight hours on Friday night  Patient thinks she may have passed all products of conception  She is here today for an Ultrasound to see if she still needs surgery tomorrow  She still is having some cramping, but reports it is improved  She is still bleeding, but states it is now like a heavy period  The following portions of the patient's history were reviewed and updated as appropriate: allergies, current medications, past family history, past medical history, past social history, past surgical history and problem list     Review of Systems   Constitutional: Negative for chills, fever and unexpected weight change  Respiratory: Negative for shortness of breath  Cardiovascular: Negative for chest pain  Gastrointestinal: Negative for abdominal pain  Genitourinary: Positive for vaginal bleeding  Skin: Negative for rash  Objective:      /72   Wt 69 3 kg (152 lb 12 8 oz)   LMP 06/20/2021   BMI 24 66 kg/m²          Physical Exam  Vitals reviewed  Constitutional:       Appearance: Normal appearance  She is normal weight  HENT:      Head: Normocephalic and atraumatic  Pulmonary:      Effort: Pulmonary effort is normal    Genitourinary:     General: Normal vulva  Labia:         Right: No rash or lesion  Left: No rash or lesion  Vagina: Bleeding present  Comments: TVUS reveals empty uterus  Skin:     General: Skin is warm and dry  Neurological:      General: No focal deficit present  Mental Status: She is alert  Psychiatric:         Mood and Affect: Mood normal          Behavior: Behavior normal            Ultrasound Probe Disinfection    A transvaginal ultrasound was performed     Prior to use, disinfection was performed with High Level Disinfection Process (Trophon)  Probe serial number [id number]: [id number] was used    Gena Villegas PA-C  06/06/22  4:53 PM

## 2022-06-22 LAB
B-HCG SERPL-ACNC: 41 MIU/ML
TSH SERPL-ACNC: 1.36 MIU/L

## 2022-06-23 DIAGNOSIS — O03.9 COMPLETE SPONTANEOUS ABORTION: Primary | ICD-10-CM

## 2022-06-24 ENCOUNTER — TELEPHONE (OUTPATIENT)
Dept: OBGYN CLINIC | Facility: CLINIC | Age: 37
End: 2022-06-24

## 2022-06-24 NOTE — TELEPHONE ENCOUNTER
Pt  Calling to request HCG results  Advised 41, repeat in 2 weeks recommended  Pt  Verbalized understanding

## 2022-07-07 LAB — B-HCG SERPL-ACNC: 5 MIU/ML

## 2022-07-11 ENCOUNTER — TELEPHONE (OUTPATIENT)
Dept: OBGYN CLINIC | Facility: CLINIC | Age: 37
End: 2022-07-11

## 2022-07-11 NOTE — TELEPHONE ENCOUNTER
Patient called to review her Hcg levels and what you recommend for follow up   Her Hcg level on 7/7 was 5    She states she stopped bleeding when she had those labs drawn    She is aware you will be back tomorrow

## 2022-07-12 DIAGNOSIS — O03.9 COMPLETE SPONTANEOUS ABORTION: Primary | ICD-10-CM

## 2022-07-15 LAB — B-HCG SERPL-ACNC: 3 MIU/ML

## 2022-08-04 ENCOUNTER — OFFICE VISIT (OUTPATIENT)
Dept: INTERNAL MEDICINE CLINIC | Facility: CLINIC | Age: 37
End: 2022-08-04
Payer: COMMERCIAL

## 2022-08-04 VITALS
TEMPERATURE: 97 F | HEIGHT: 66 IN | OXYGEN SATURATION: 99 % | SYSTOLIC BLOOD PRESSURE: 104 MMHG | DIASTOLIC BLOOD PRESSURE: 68 MMHG | HEART RATE: 81 BPM | BODY MASS INDEX: 25.7 KG/M2 | WEIGHT: 159.9 LBS | RESPIRATION RATE: 16 BRPM

## 2022-08-04 DIAGNOSIS — Z13.220 ENCOUNTER FOR LIPID SCREENING FOR CARDIOVASCULAR DISEASE: ICD-10-CM

## 2022-08-04 DIAGNOSIS — E04.1 THYROID NODULE: ICD-10-CM

## 2022-08-04 DIAGNOSIS — Z13.6 ENCOUNTER FOR LIPID SCREENING FOR CARDIOVASCULAR DISEASE: ICD-10-CM

## 2022-08-04 DIAGNOSIS — Z00.00 WELLNESS EXAMINATION: Primary | ICD-10-CM

## 2022-08-04 PROBLEM — R03.1 INCIDENTAL FINDING OF LOW BLOOD PRESSURE: Status: RESOLVED | Noted: 2021-10-13 | Resolved: 2022-08-04

## 2022-08-04 PROCEDURE — 99395 PREV VISIT EST AGE 18-39: CPT | Performed by: INTERNAL MEDICINE

## 2022-08-04 NOTE — PROGRESS NOTES
Assessment/Plan:     Diagnoses and all orders for this visit:    Wellness examination  Comments:  UTD on preventative care  BW ordered and f/u in 12 mos or prn  forms completed per patient request    Thyroid nodule  -     Comprehensive metabolic panel; Future  -     TSH, 3rd generation with Free T4 reflex    Encounter for lipid screening for cardiovascular disease  -     Lipid Panel with Direct LDL reflex; Future      BMI Counseling: Body mass index is 25 81 kg/m²  The BMI is above normal  Exercise recommendations include exercising 3-5 times per week  Subjective:      Patient ID: Chris Silva is a 39 y o  female  HPI    Here for physical, Rosibel Roman is overall doing well  She was undergoing fertility treatments but they were unsuccessful, she had a miscarriage  Her and her  do not want to pursue treatments again and would like to adopt  She brings form today to be completed for this  She is taking vitamins and supplements OTC which has helped her headaches  She was taking thyroid medication recently because she was undergoing fertility treatments but has since stopped  She has not had to take this ever in the past   She has no new questions or concerns today and ROS is otherwise negative  Past Medical History:   Diagnosis Date    History of Lyme disease     patient stated it was years ago     Vitals:    08/04/22 0950   BP: 104/68   BP Location: Left arm   Patient Position: Sitting   Cuff Size: Adult   Pulse: 81   Resp: 16   Temp: (!) 97 °F (36 1 °C)   TempSrc: Tympanic   SpO2: 99%   Weight: 72 5 kg (159 lb 14 4 oz)   Height: 5' 6" (1 676 m)     Body mass index is 25 81 kg/m²      Current Outpatient Medications:     Cyanocobalamin (VITAMIN B 12 PO), Take by mouth, Disp: , Rfl:     MAGNESIUM PO, Take by mouth, Disp: , Rfl:     Riboflavin (VITAMIN B2 PO), Take by mouth, Disp: , Rfl:   Allergies   Allergen Reactions    No Known Allergies          Review of Systems   Constitutional: Negative for fever  HENT: Negative for congestion  Eyes: Negative for visual disturbance  Respiratory: Negative for shortness of breath  Cardiovascular: Negative for chest pain  Gastrointestinal: Negative for abdominal pain  Endocrine: Negative for polyuria  Genitourinary: Negative for difficulty urinating  Musculoskeletal: Negative for arthralgias  Skin: Negative for rash  Allergic/Immunologic: Negative for immunocompromised state  Neurological: Negative for dizziness  Psychiatric/Behavioral: Negative for dysphoric mood  Objective:      /68 (BP Location: Left arm, Patient Position: Sitting, Cuff Size: Adult)   Pulse 81   Temp (!) 97 °F (36 1 °C) (Tympanic)   Resp 16   Ht 5' 6" (1 676 m)   Wt 72 5 kg (159 lb 14 4 oz)   SpO2 99%   Breastfeeding Unknown   BMI 25 81 kg/m²          Physical Exam  Vitals reviewed  Constitutional:       Appearance: Normal appearance  HENT:      Head: Normocephalic and atraumatic  Right Ear: Tympanic membrane normal       Left Ear: Tympanic membrane normal    Eyes:      Conjunctiva/sclera: Conjunctivae normal    Cardiovascular:      Rate and Rhythm: Normal rate and regular rhythm  Heart sounds: No murmur heard  Pulmonary:      Effort: Pulmonary effort is normal       Breath sounds: No wheezing or rales  Abdominal:      General: Bowel sounds are normal       Palpations: Abdomen is soft  Tenderness: There is no abdominal tenderness  Musculoskeletal:      Right lower leg: No edema  Left lower leg: No edema  Neurological:      Mental Status: She is alert  Mental status is at baseline     Psychiatric:         Mood and Affect: Mood normal          Behavior: Behavior normal          Lab Results   Component Value Date    [id number] 1 478 10/19/2021    TSH 2 24 05/10/2021

## 2022-08-08 LAB
ALBUMIN SERPL-MCNC: 4.4 G/DL (ref 3.6–5.1)
ALBUMIN/GLOB SERPL: 1.8 (CALC) (ref 1–2.5)
ALP SERPL-CCNC: 48 U/L (ref 31–125)
ALT SERPL-CCNC: 13 U/L (ref 6–29)
AST SERPL-CCNC: 15 U/L (ref 10–30)
BILIRUB SERPL-MCNC: 1.1 MG/DL (ref 0.2–1.2)
BUN SERPL-MCNC: 14 MG/DL (ref 7–25)
BUN/CREAT SERPL: NORMAL (CALC) (ref 6–22)
CALCIUM SERPL-MCNC: 9.3 MG/DL (ref 8.6–10.2)
CHLORIDE SERPL-SCNC: 106 MMOL/L (ref 98–110)
CHOLEST SERPL-MCNC: 170 MG/DL
CHOLEST/HDLC SERPL: 2.5 (CALC)
CO2 SERPL-SCNC: 30 MMOL/L (ref 20–32)
CREAT SERPL-MCNC: 0.89 MG/DL (ref 0.5–0.97)
GFR/BSA.PRED SERPLBLD CYS-BASED-ARV: 86 ML/MIN/1.73M2
GLOBULIN SER CALC-MCNC: 2.5 G/DL (CALC) (ref 1.9–3.7)
GLUCOSE SERPL-MCNC: 92 MG/DL (ref 65–99)
HDLC SERPL-MCNC: 69 MG/DL
LDLC SERPL CALC-MCNC: 84 MG/DL (CALC)
NONHDLC SERPL-MCNC: 101 MG/DL (CALC)
POTASSIUM SERPL-SCNC: 4 MMOL/L (ref 3.5–5.3)
PROT SERPL-MCNC: 6.9 G/DL (ref 6.1–8.1)
SODIUM SERPL-SCNC: 141 MMOL/L (ref 135–146)
TRIGL SERPL-MCNC: 79 MG/DL
TSH SERPL-ACNC: 2.24 MIU/L

## 2022-10-11 ENCOUNTER — ANNUAL EXAM (OUTPATIENT)
Dept: OBGYN CLINIC | Facility: CLINIC | Age: 37
End: 2022-10-11
Payer: COMMERCIAL

## 2022-10-11 VITALS
WEIGHT: 163 LBS | SYSTOLIC BLOOD PRESSURE: 116 MMHG | BODY MASS INDEX: 26.2 KG/M2 | DIASTOLIC BLOOD PRESSURE: 62 MMHG | HEIGHT: 66 IN

## 2022-10-11 DIAGNOSIS — Z11.51 SCREENING FOR HUMAN PAPILLOMAVIRUS (HPV): ICD-10-CM

## 2022-10-11 DIAGNOSIS — Z01.419 WELL WOMAN EXAM WITH ROUTINE GYNECOLOGICAL EXAM: Primary | ICD-10-CM

## 2022-10-11 DIAGNOSIS — Z12.4 SCREENING FOR MALIGNANT NEOPLASM OF CERVIX: ICD-10-CM

## 2022-10-11 PROBLEM — O03.9 MISCARRIAGE AT 8 TO 28 WEEKS GESTATION: Status: ACTIVE | Noted: 2022-05-29

## 2022-10-11 PROCEDURE — G0145 SCR C/V CYTO,THINLAYER,RESCR: HCPCS | Performed by: PHYSICIAN ASSISTANT

## 2022-10-11 PROCEDURE — 99395 PREV VISIT EST AGE 18-39: CPT | Performed by: PHYSICIAN ASSISTANT

## 2022-10-11 PROCEDURE — G0476 HPV COMBO ASSAY CA SCREEN: HCPCS | Performed by: PHYSICIAN ASSISTANT

## 2022-10-11 NOTE — PROGRESS NOTES
ASSESSMENT & PLAN:   Diagnoses and all orders for this visit:    Well woman exam with routine gynecological exam  -     Liquid-based pap, screening    Screening for malignant neoplasm of cervix  -     Liquid-based pap, screening    Screening for human papillomavirus (HPV)  -     Liquid-based pap, screening      The following were reviewed in today's visit: ASCCP guidelines, Gardisil vaccination, STD testing breast self exam, STD testing, exercise and healthy diet  Patient to return to office in yearly for annual exam      All questions have been answered to her satisfaction  CC:  Annual Gynecologic Examination  Chief Complaint   Patient presents with   • Gynecologic Exam     Patient is here today for her yearly exam, pap today with HPV testing (5/5/21-wnl), mammo/DXA/colonoscopy-not indicated  Patient has no concerns at this time  HPI: Jose Enrique Fisher is a 39 y o  [place] who presents for annual gynecologic examination  She has the following concerns:  None today  Patient reports she and her  are still trying to conceive, but that his sperm are abnormally shaped  Her  is seeing urology  They have also decided to pursue adoption  Health Maintenance:    Exercise: intermittently  Breast exams/breast awareness: sometimes  Diet: mostly well balanced      Past Medical History:   Diagnosis Date   • History of Lyme disease     patient stated it was years ago       Past Surgical History:   Procedure Laterality Date   • HYMENPLASTY     • OVUM / OOCYTE RETRIEVAL  12/21/2021    RMA   • TRANSFER EMBRYO INTRAUTERINE  02/04/2022    R[facility]   • WISDOM TOOTH EXTRACTION         Past OB/Gyn History:  Period Duration (Days): 4  Period Pattern: Regular  Dysmenorrhea: (!) Moderate  Dysmenorrhea Symptoms: CrampingNo LMP recorded  Last Pap: 2021 : NILM, no HPV run last year  History of abnormal Pap smear: no  HPV vaccine completed: yes    Patient is currently sexually active     STD testing: no  Current contraception: none      Family History  Family History   Problem Relation Age of Onset   • Osteoporosis Mother    • Thyroid disease Mother    • Hearing loss Father    • Prostate cancer Father 58   • No Known Problems Sister    • Melanoma Brother    • Osteoporosis Maternal Grandmother    • Osteoporosis Maternal Aunt    • Heart disease Neg Hx        Family history of uterine or ovarian cancer: no  Family history of breast cancer: no  Family history of colon cancer: no    Social History:  Social History     Socioeconomic History   • Marital status: /Civil Union     Spouse name: Not on file   • Number of children: Not on file   • Years of education: Not on file   • Highest education level: Not on file   Occupational History   • Not on file   Tobacco Use   • Smoking status: Never Smoker   • Smokeless tobacco: Never Used   Vaping Use   • Vaping Use: Never used   Substance and Sexual Activity   • Alcohol use: Not Currently   • Drug use: Never   • Sexual activity: Yes     Partners: Male     Birth control/protection: None   Other Topics Concern   • Not on file   Social History Narrative   • Not on file     Social Determinants of Health     Financial Resource Strain: Not on file   Food Insecurity: Not on file   Transportation Needs: Not on file   Physical Activity: Not on file   Stress: Not on file   Social Connections: Not on file   Intimate Partner Violence: Not on file   Housing Stability: Not on file     Domestic violence screen: negative    Allergies: Allergies   Allergen Reactions   • No Known Allergies        Medications:    Current Outpatient Medications:   •  Cyanocobalamin (VITAMIN B 12 PO), Take by mouth, Disp: , Rfl:   •  MAGNESIUM PO, Take by mouth, Disp: , Rfl:   •  Riboflavin (VITAMIN B2 PO), Take by mouth, Disp: , Rfl:     Review of Systems:  Review of Systems   Constitutional: Negative for chills, fever and unexpected weight change  Respiratory: Negative for shortness of breath  Cardiovascular: Negative for chest pain  Gastrointestinal: Negative for abdominal pain, diarrhea, nausea and vomiting  Skin: Negative for rash  Psychiatric/Behavioral: Negative for dysphoric mood  The patient is not nervous/anxious  Physical Exam:  /62 (BP Location: Right arm, Patient Position: Sitting, Cuff Size: Standard)   Ht 5' 6" (1 676 m)   Wt 73 9 kg (163 lb)   BMI 26 31 kg/m²    Physical Exam  Constitutional:       Appearance: Normal appearance  Genitourinary:      Vulva and urethral meatus normal       No lesions in the vagina  Right Labia: No rash or lesions  Left Labia: No lesions or rash  No vaginal discharge, erythema or bleeding  Right Adnexa: not tender and no mass present  Left Adnexa: not tender and no mass present  No cervical discharge or lesion  Uterus is not tender  Breasts: Breasts are symmetrical       Right: No mass, skin change or axillary adenopathy  Left: No mass, skin change or axillary adenopathy  HENT:      Head: Normocephalic and atraumatic  Cardiovascular:      Rate and Rhythm: Normal rate and regular rhythm  Heart sounds: Normal heart sounds  No murmur heard  No friction rub  No gallop  Pulmonary:      Effort: Pulmonary effort is normal       Breath sounds: Normal breath sounds  No wheezing, rhonchi or rales  Abdominal:      General: Abdomen is flat  There is no distension  Palpations: Abdomen is soft  Tenderness: There is no abdominal tenderness  Musculoskeletal:      Cervical back: Neck supple  Lymphadenopathy:      Upper Body:      Right upper body: No axillary adenopathy  Left upper body: No axillary adenopathy  Neurological:      General: No focal deficit present  Mental Status: She is alert  Skin:     General: Skin is warm and dry  Psychiatric:         Mood and Affect: Mood normal          Behavior: Behavior normal    Vitals reviewed

## 2022-10-13 LAB
HPV HR 12 DNA CVX QL NAA+PROBE: NEGATIVE
HPV16 DNA CVX QL NAA+PROBE: NEGATIVE
HPV18 DNA CVX QL NAA+PROBE: NEGATIVE

## 2022-10-18 LAB
LAB AP GYN PRIMARY INTERPRETATION: NORMAL
Lab: NORMAL

## 2023-08-01 ENCOUNTER — RA CDI HCC (OUTPATIENT)
Dept: OTHER | Facility: HOSPITAL | Age: 38
End: 2023-08-01

## 2023-08-01 NOTE — PROGRESS NOTES
[facility] coding opportunities       Chart reviewed, no opportunity found: CHART REVIEWED, NO OPPORTUNITY FOUND        Patients Insurance        Commercial Insurance: Manuel Camacho

## 2023-08-08 ENCOUNTER — OFFICE VISIT (OUTPATIENT)
Dept: INTERNAL MEDICINE CLINIC | Facility: CLINIC | Age: 38
End: 2023-08-08
Payer: COMMERCIAL

## 2023-08-08 VITALS
SYSTOLIC BLOOD PRESSURE: 118 MMHG | TEMPERATURE: 98 F | WEIGHT: 158 LBS | HEART RATE: 97 BPM | OXYGEN SATURATION: 98 % | DIASTOLIC BLOOD PRESSURE: 78 MMHG | BODY MASS INDEX: 25.39 KG/M2 | HEIGHT: 66 IN

## 2023-08-08 DIAGNOSIS — Z00.00 WELLNESS EXAMINATION: Primary | ICD-10-CM

## 2023-08-08 DIAGNOSIS — E04.1 THYROID NODULE: ICD-10-CM

## 2023-08-08 DIAGNOSIS — Z13.6 ENCOUNTER FOR LIPID SCREENING FOR CARDIOVASCULAR DISEASE: ICD-10-CM

## 2023-08-08 DIAGNOSIS — Z13.220 ENCOUNTER FOR LIPID SCREENING FOR CARDIOVASCULAR DISEASE: ICD-10-CM

## 2023-08-08 DIAGNOSIS — J30.2 SEASONAL ALLERGIES: ICD-10-CM

## 2023-08-08 PROBLEM — H81.10 BENIGN PAROXYSMAL POSITIONAL VERTIGO: Status: RESOLVED | Noted: 2021-11-04 | Resolved: 2023-08-08

## 2023-08-08 PROBLEM — Z86.69 HISTORY OF MIGRAINE: Status: ACTIVE | Noted: 2021-11-04

## 2023-08-08 PROCEDURE — 99395 PREV VISIT EST AGE 18-39: CPT | Performed by: INTERNAL MEDICINE

## 2023-08-08 RX ORDER — MULTIVITAMIN
1 TABLET ORAL DAILY
COMMUNITY

## 2023-08-08 NOTE — PATIENT INSTRUCTIONS
Fasting blood work  Return in 12 months or sooner if questions    Wellness Visit for Adults   AMBULATORY CARE:   A wellness visit  is when you see your healthcare provider to get screened for health problems. Your healthcare provider will also give you advice on how to stay healthy. Write down your questions so you remember to ask them. Ask your healthcare provider how often you should have a wellness visit. What happens at a wellness visit:  Your healthcare provider will ask about your health, and your family history of health problems. This includes high blood pressure, heart disease, and cancer. He or she will ask if you have symptoms that concern you, if you smoke, and about your mood. You may also be asked about your intake of medicines, supplements, food, and alcohol. Any of the following may be done: Your weight  will be checked. Your height may also be checked so your body mass index (BMI) can be calculated. Your BMI shows if you are at a healthy weight. Your blood pressure  and heart rate will be checked. Your temperature may also be checked. Blood and urine tests  may be done. Blood tests may be done to check your cholesterol levels. Abnormal cholesterol levels increase your risk for heart disease and stroke. You may also need a blood or urine test to check for diabetes if you are at increased risk. Urine tests may be done to look for signs of an infection or kidney disease. A physical exam  includes checking your heartbeat and lungs with a stethoscope. Your healthcare provider may also check your skin to look for sun damage. Screening tests  may be recommended. A screening test is done to check for diseases that may not cause symptoms. The screening tests you may need depend on your age, gender, family history, and lifestyle habits. For example, colorectal screening may be recommended if you are 48years old or older.     Screening tests you need if you are a woman:   A Pap smear  is used to screen for cervical cancer. Pap smears are usually done every 3 to 5 years depending on your age. You may need them more often if you have had abnormal Pap smear test results in the past. Ask your healthcare provider how often you should have a Pap smear. A mammogram  is an x-ray of your breasts to screen for breast cancer. Experts recommend mammograms every 2 years starting at age 48 years. You may need a mammogram at age 52 years or younger if you have an increased risk for breast cancer. Talk to your healthcare provider about when you should start having mammograms and how often you need them. Vaccines you may need:   Get an influenza vaccine  every year. The influenza vaccine protects you from the flu. Several types of viruses cause the flu. The viruses change over time, so new vaccines are made each year. Get a tetanus-diphtheria (Td) booster vaccine  every 10 years. This vaccine protects you against tetanus and diphtheria. Tetanus is a severe infection that may cause painful muscle spasms and lockjaw. Diphtheria is a severe bacterial infection that causes a thick covering in the back of your mouth and throat. Get a human papillomavirus (HPV) vaccine  if you are female and aged 23 to 32 or male 23 to 24 and never received it. This vaccine protects you from HPV infection. HPV is the most common infection spread by sexual contact. HPV may also cause vaginal, penile, and anal cancers. Get a pneumococcal vaccine  if you are aged 72 years or older. The pneumococcal vaccine is an injection given to protect you from pneumococcal disease. Pneumococcal disease is an infection caused by pneumococcal bacteria. The infection may cause pneumonia, meningitis, or an ear infection. Get a shingles vaccine  if you are 60 or older, even if you have had shingles before. The shingles vaccine is an injection to protect you from the varicella-zoster virus. This is the same virus that causes chickenpox.  Shingles is a painful rash that develops in people who had chickenpox or have been exposed to the virus. How to eat healthy:  My Plate is a model for planning healthy meals. It shows the types and amounts of foods that should go on your plate. Fruits and vegetables make up about half of your plate, and grains and protein make up the other half. A serving of dairy is included on the side of your plate. The amount of calories and serving sizes you need depends on your age, gender, weight, and height. Examples of healthy foods are listed below:  Eat a variety of vegetables  such as dark green, red, and orange vegetables. You can also include canned vegetables low in sodium (salt) and frozen vegetables without added butter or sauces. Eat a variety of fresh fruits , canned fruit in 100% juice, frozen fruit, and dried fruit. Include whole grains. At least half of the grains you eat should be whole grains. Examples include whole-wheat bread, wheat pasta, brown rice, and whole-grain cereals such as oatmeal.    Eat a variety of protein foods such as seafood (fish and shellfish), lean meat, and poultry without skin (turkey and chicken). Examples of lean meats include pork leg, shoulder, or tenderloin, and beef round, sirloin, tenderloin, and extra lean ground beef. Other protein foods include eggs and egg substitutes, beans, peas, soy products, nuts, and seeds. Choose low-fat dairy products such as skim or 1% milk or low-fat yogurt, cheese, and cottage cheese. Limit unhealthy fats  such as butter, hard margarine, and shortening. Exercise:  Exercise at least 30 minutes per day on most days of the week. Some examples of exercise include walking, biking, dancing, and swimming. You can also fit in more physical activity by taking the stairs instead of the elevator or parking farther away from stores. Include muscle strengthening activities 2 days each week. Regular exercise provides many health benefits.  It helps you manage your weight, and decreases your risk for type 2 diabetes, heart disease, stroke, and high blood pressure. Exercise can also help improve your mood. Ask your healthcare provider about the best exercise plan for you. General health and safety guidelines:   Do not smoke. Nicotine and other chemicals in cigarettes and cigars can cause lung damage. Ask your healthcare provider for information if you currently smoke and need help to quit. E-cigarettes or smokeless tobacco still contain nicotine. Talk to your healthcare provider before you use these products. Limit alcohol. A drink of alcohol is 12 ounces of beer, 5 ounces of wine, or 1½ ounces of liquor. Lose weight, if needed. Being overweight increases your risk of certain health conditions. These include heart disease, high blood pressure, type 2 diabetes, and certain types of cancer. Protect your skin. Do not sunbathe or use tanning beds. Use sunscreen with a SPF 15 or higher. Apply sunscreen at least 15 minutes before you go outside. Reapply sunscreen every 2 hours. Wear protective clothing, hats, and sunglasses when you are outside. Drive safely. Always wear your seatbelt. Make sure everyone in your car wears a seatbelt. A seatbelt can save your life if you are in an accident. Do not use your cell phone when you are driving. This could distract you and cause an accident. Pull over if you need to make a call or send a text message. Practice safe sex. Use latex condoms if are sexually active and have more than one partner. Your healthcare provider may recommend screening tests for sexually transmitted infections (STIs). Wear helmets, lifejackets, and protective gear. Always wear a helmet when you ride a bike or motorcycle, go skiing, or play sports that could cause a head injury. Wear protective equipment when you play sports. Wear a lifejacket when you are on a boat or doing water sports.     © Copyright [employer] 2022 Information is for End User's use only and may not be sold, redistributed or otherwise used for commercial purposes. The above information is an  only. It is not intended as medical advice for individual conditions or treatments. Talk to your doctor, nurse or pharmacist before following any medical regimen to see if it is safe and effective for you.

## 2023-08-08 NOTE — PROGRESS NOTES
Name: Blake Merida      : 1985      MRN: [id number]  Encounter Provider: Danica Spievy DO  Encounter Date: 2023   Encounter department: [address] [place] [address]     1. Wellness examination    2. Thyroid nodule  Comments:  benign per previous reports & s/p endo eval.  check TFTs annually  Orders:  -     TSH, 3rd generation with Free T4 reflex    3. Seasonal allergies  Comments:  mild in spring and resolved now  Orders:  -     Comprehensive metabolic panel; Future    4. Encounter for lipid screening for cardiovascular disease  -     Lipid Panel with Direct LDL reflex; Future      BMI Counseling: Body mass index is 25.5 kg/m². The BMI is above normal. Exercise recommendations include exercising 3-5 times per week. Rationale for BMI follow-up plan is due to patient being overweight or obese. Depression Screening and Follow-up Plan: Patient was screened for depression during today's encounter. They screened negative with a PHQ-2 score of 0. Subjective      HPI     Here for physical, Earnest Anes is overall doing well. She is not taking any medications on a regular basis except an OTC MVI. Her headaches have improved. She had some mild seasonal allergies this spring but they have since resolved. She is active and in process of adopting a 3 yr old boy. She is UTD On COVID booster, Tdap and will get flu vaccine in the fall. She had neg PPD last year and has not had Tb exposure or symptoms. ROS otherwise negative, no other complaints. Review of Systems   Constitutional: Negative for fever. HENT: Negative for congestion. Eyes: Negative for visual disturbance. Respiratory: Negative for shortness of breath. Cardiovascular: Negative for chest pain. Gastrointestinal: Negative for abdominal pain. Endocrine: Negative for polyuria. Genitourinary: Negative for difficulty urinating. Musculoskeletal: Negative for arthralgias.    Skin: Negative for rash.   Allergic/Immunologic: Negative for immunocompromised state. Neurological: Negative for headaches. Psychiatric/Behavioral: Negative for dysphoric mood. Current Outpatient Medications on File Prior to Visit   Medication Sig   • Multiple Vitamin (multivitamin) tablet Take 1 tablet by mouth daily   • [DISCONTINUED] Cyanocobalamin (VITAMIN B 12 PO) Take by mouth   • [DISCONTINUED] MAGNESIUM PO Take by mouth   • [DISCONTINUED] Riboflavin (VITAMIN B2 PO) Take by mouth       Objective     /78 (BP Location: Left arm, Patient Position: Sitting, Cuff Size: Standard)   Pulse 97   Temp 98 °F (36.7 °C)   Ht 5' 6" (1.676 m)   Wt 71.7 kg (158 lb)   SpO2 98%   BMI 25.50 kg/m²     Physical Exam  Vitals reviewed. Constitutional:       General: She is not in acute distress. HENT:      Head: Normocephalic and atraumatic. Right Ear: Tympanic membrane normal.      Left Ear: Tympanic membrane normal.   Eyes:      Conjunctiva/sclera: Conjunctivae normal.   Cardiovascular:      Rate and Rhythm: Normal rate and regular rhythm. Heart sounds: No murmur heard. Pulmonary:      Effort: Pulmonary effort is normal.      Breath sounds: No wheezing or rales. Abdominal:      General: Bowel sounds are normal.      Palpations: Abdomen is soft. Tenderness: There is no abdominal tenderness. Musculoskeletal:      Cervical back: Neck supple. Right lower leg: No edema. Left lower leg: No edema. Neurological:      Mental Status: She is alert. Mental status is at baseline.    Psychiatric:         Mood and Affect: Mood normal.         Behavior: Behavior normal.       Jose Roberto Elaine DO

## 2023-09-27 LAB
ALBUMIN SERPL-MCNC: 4.3 G/DL (ref 3.6–5.1)
ALBUMIN/GLOB SERPL: 1.7 (CALC) (ref 1–2.5)
ALP SERPL-CCNC: 52 U/L (ref 31–125)
ALT SERPL-CCNC: 9 U/L (ref 6–29)
AST SERPL-CCNC: 14 U/L (ref 10–30)
BILIRUB SERPL-MCNC: 0.9 MG/DL (ref 0.2–1.2)
BUN SERPL-MCNC: 13 MG/DL (ref 7–25)
BUN/CREAT SERPL: NORMAL (CALC) (ref 6–22)
CALCIUM SERPL-MCNC: 9.6 MG/DL (ref 8.6–10.2)
CHLORIDE SERPL-SCNC: 105 MMOL/L (ref 98–110)
CHOLEST SERPL-MCNC: 187 MG/DL
CHOLEST/HDLC SERPL: 2.9 (CALC)
CO2 SERPL-SCNC: 30 MMOL/L (ref 20–32)
CREAT SERPL-MCNC: 0.84 MG/DL (ref 0.5–0.97)
GFR/BSA.PRED SERPLBLD CYS-BASED-ARV: 91 ML/MIN/1.73M2
GLOBULIN SER CALC-MCNC: 2.6 G/DL (CALC) (ref 1.9–3.7)
GLUCOSE SERPL-MCNC: 96 MG/DL (ref 65–99)
HDLC SERPL-MCNC: 65 MG/DL
LDLC SERPL CALC-MCNC: 107 MG/DL (CALC)
NONHDLC SERPL-MCNC: 122 MG/DL (CALC)
POTASSIUM SERPL-SCNC: 4 MMOL/L (ref 3.5–5.3)
PROT SERPL-MCNC: 6.9 G/DL (ref 6.1–8.1)
SODIUM SERPL-SCNC: 140 MMOL/L (ref 135–146)
TRIGL SERPL-MCNC: 64 MG/DL

## 2023-10-08 LAB — TSH SERPL-ACNC: 1.37 MIU/L

## 2023-10-13 ENCOUNTER — ANNUAL EXAM (OUTPATIENT)
Dept: OBGYN CLINIC | Facility: CLINIC | Age: 38
End: 2023-10-13
Payer: COMMERCIAL

## 2023-10-13 VITALS
BODY MASS INDEX: 26.16 KG/M2 | HEIGHT: 66 IN | DIASTOLIC BLOOD PRESSURE: 70 MMHG | WEIGHT: 162.8 LBS | SYSTOLIC BLOOD PRESSURE: 100 MMHG

## 2023-10-13 DIAGNOSIS — Z01.419 WOMEN'S ANNUAL ROUTINE GYNECOLOGICAL EXAMINATION: Primary | ICD-10-CM

## 2023-10-13 DIAGNOSIS — N94.10 DYSPAREUNIA IN FEMALE: ICD-10-CM

## 2023-10-13 PROBLEM — Z86.69 HISTORY OF MIGRAINE: Status: RESOLVED | Noted: 2021-11-04 | Resolved: 2023-10-13

## 2023-10-13 PROCEDURE — S0612 ANNUAL GYNECOLOGICAL EXAMINA: HCPCS | Performed by: PHYSICIAN ASSISTANT

## 2023-10-13 NOTE — PROGRESS NOTES
ASSESSMENT & PLAN:   Diagnoses and all orders for this visit:    Women's annual routine gynecological examination    Dyspareunia in female  -     Ambulatory Referral to Physical Therapy; Future          The following were reviewed in today's visit: ASCCP guidelines, Gardisil vaccination, STD testing breast self exam, STD testing, exercise, and healthy diet. Patient to return to office in yearly for annual exam.     All questions have been answered to her satisfaction. CC:  Annual Gynecologic Examination  Chief Complaint   Patient presents with    Gynecologic Exam     Annual exam, pap not indicated. Pt would like to discuss pain during intercourse. Pap 10/11/2022 neg/neg HPV  neg pap 5/5/21       HPI: Peyman Cheung is a 44 y.o. Whitney Arley who presents for annual gynecologic examination. She has the following concerns:  Patient reports pain with intercourse. This has always been a problem, but she states insertion is particularly uncomfortable despite use of lubrication. Patient has a history of hymenectomy/hymenplasty. Patient states they recently adopted a 3YO son in March. She and her  are OK with conception but also looking to adopt again      Health Maintenance:    Exercise: frequently  Breast exams/breast awareness: yes    Past Medical History:   Diagnosis Date    History of Lyme disease     patient stated it was years ago    Miscarriage 6/2022       Past Surgical History:   Procedure Laterality Date    HYMENPLASTY      OVUM / OOCYTE RETRIEVAL  12/21/2021    RMA    TRANSFER EMBRYO INTRAUTERINE  02/04/2022    RMA    WISDOM TOOTH EXTRACTION         Past OB/Gyn History:  Period Cycle (Days): 30  Period Duration (Days): 4  Period Pattern: Regular  Menstrual Flow: Light, Moderate, Heavy  Menstrual Control: Maxi pad, Thin pad, Panty linerPatient's last menstrual period was 09/16/2023 (approximate).     Last Pap: 2022 : no abnormalities  History of abnormal Pap smear: no  HPV vaccine completed: yes    Patient is currently sexually active. STD testing: no  Current contraception: none      Family History  Family History   Problem Relation Age of Onset    Osteoporosis Mother     Thyroid disease Mother     Hearing loss Father     Prostate cancer Father 58    Cancer Father     No Known Problems Sister     Cancer Brother     Osteoporosis Maternal Grandmother     Osteoporosis Maternal Grandfather     Osteoporosis Maternal Aunt     Heart disease Neg Hx        Family history of uterine or ovarian cancer: no  Family history of breast cancer: no  Family history of colon cancer: no    Social History:  Social History     Socioeconomic History    Marital status: /Civil Union     Spouse name: Not on file    Number of children: Not on file    Years of education: Not on file    Highest education level: Not on file   Occupational History    Not on file   Tobacco Use    Smoking status: Never    Smokeless tobacco: Never   Vaping Use    Vaping Use: Never used   Substance and Sexual Activity    Alcohol use: Never    Drug use: Never    Sexual activity: Yes     Partners: Male     Birth control/protection: None   Other Topics Concern    Not on file   Social History Narrative    Not on file     Social Determinants of Health     Financial Resource Strain: Not on file   Food Insecurity: Not on file   Transportation Needs: Not on file   Physical Activity: Not on file   Stress: Not on file   Social Connections: Not on file   Intimate Partner Violence: Not on file   Housing Stability: Not on file     Domestic violence screen: negative    Allergies:  No Known Allergies      Medications:    Current Outpatient Medications:     Multiple Vitamin (multivitamin) tablet, Take 1 tablet by mouth daily, Disp: , Rfl:     Review of Systems:  Review of Systems   Constitutional:  Negative for chills, fever and unexpected weight change. Respiratory:  Negative for shortness of breath. Cardiovascular:  Negative for chest pain. Gastrointestinal:  Negative for abdominal pain, diarrhea, nausea and vomiting. Skin:  Negative for rash. Psychiatric/Behavioral:  Negative for dysphoric mood. The patient is not nervous/anxious. Physical Exam:  /70 (BP Location: Left arm, Patient Position: Sitting, Cuff Size: Standard)   Ht 5' 6" (1.676 m)   Wt 73.8 kg (162 lb 12.8 oz)   LMP 09/16/2023 (Approximate)   Breastfeeding No   BMI 26.28 kg/m²    Physical Exam  Constitutional:       Appearance: Normal appearance. Genitourinary:      Vulva and urethral meatus normal.      No lesions in the vagina. Right Labia: No rash or lesions. Left Labia: No lesions or rash. No vaginal discharge, erythema or bleeding. Right Adnexa: not tender and no mass present. Left Adnexa: not tender and no mass present. No cervical discharge or lesion. Uterus is not tender. Breasts:     Breasts are symmetrical.      Right: No mass or skin change. Left: No mass or skin change. HENT:      Head: Normocephalic and atraumatic. Cardiovascular:      Rate and Rhythm: Normal rate and regular rhythm. Heart sounds: Normal heart sounds. No murmur heard. No friction rub. No gallop. Pulmonary:      Effort: Pulmonary effort is normal.      Breath sounds: Normal breath sounds. No wheezing, rhonchi or rales. Abdominal:      General: Abdomen is flat. There is no distension. Palpations: Abdomen is soft. Tenderness: There is no abdominal tenderness. Musculoskeletal:      Cervical back: Neck supple. Lymphadenopathy:      Upper Body:      Right upper body: No axillary adenopathy. Left upper body: No axillary adenopathy. Neurological:      General: No focal deficit present. Mental Status: She is alert. Skin:     General: Skin is warm and dry. Psychiatric:         Mood and Affect: Mood normal.         Behavior: Behavior normal.   Vitals reviewed.

## 2023-11-29 ENCOUNTER — OFFICE VISIT (OUTPATIENT)
Dept: PODIATRY | Facility: CLINIC | Age: 38
End: 2023-11-29
Payer: COMMERCIAL

## 2023-11-29 VITALS
HEIGHT: 66 IN | SYSTOLIC BLOOD PRESSURE: 104 MMHG | WEIGHT: 162 LBS | DIASTOLIC BLOOD PRESSURE: 69 MMHG | BODY MASS INDEX: 26.03 KG/M2 | HEART RATE: 69 BPM

## 2023-11-29 DIAGNOSIS — Q66.70 CAVUS DEFORMITY OF FOOT: Primary | ICD-10-CM

## 2023-11-29 PROCEDURE — 99213 OFFICE O/P EST LOW 20 MIN: CPT | Performed by: PODIATRIST

## 2023-11-29 NOTE — PROGRESS NOTES
PATIENT:  Sohan Trent  1985       ASSESSMENT:     1. Cavus deformity of foot  Device prior authorization                  PLAN:  1. Patient was counseled and educated on the condition and the diagnosis. 2. The diagnosis, treatment options and prognosis were discussed with the patient. 3. Orthotics evaluated. Recommended new pairs. Will check her insurance benefit again for possible orthotic coverage. 4. Pt to return in 3-4 weeks for orthotic casting. Subjective:       HPI  The patient presents for foot evaluation. She needs new orthotics. She has pes cavus foot. Aches around ankle if she does not wear orthotics. Denied any swelling. No associated numbness or paresthesia. No significant weakness. The following portions of the patient's history were reviewed and updated as appropriate: allergies, current medications, past family history, past medical history, past social history, past surgical history and problem list.  All pertinent labs and images were reviewed. Past Medical History  Past Medical History:   Diagnosis Date    History of Lyme disease     patient stated it was years ago    Miscarriage 6/2022       Past Surgical History  Past Surgical History:   Procedure Laterality Date    HYMENPLASTY      OVUM / OOCYTE RETRIEVAL  12/21/2021    RMA    TRANSFER EMBRYO INTRAUTERINE  02/04/2022    RMA    WISDOM TOOTH EXTRACTION          Allergies:  Patient has no known allergies. Medications:  Current Outpatient Medications   Medication Sig Dispense Refill    Multiple Vitamin (multivitamin) tablet Take 1 tablet by mouth daily       No current facility-administered medications for this visit.        Social History:  Social History     Socioeconomic History    Marital status: /Civil Union     Spouse name: None    Number of children: None    Years of education: None    Highest education level: None   Occupational History    None   Tobacco Use    Smoking status: Never    Smokeless tobacco: Never   Vaping Use    Vaping Use: Never used   Substance and Sexual Activity    Alcohol use: Never    Drug use: Never    Sexual activity: Yes     Partners: Male     Birth control/protection: None   Other Topics Concern    None   Social History Narrative    None     Social Determinants of Health     Financial Resource Strain: Not on file   Food Insecurity: Not on file   Transportation Needs: Not on file   Physical Activity: Not on file   Stress: Not on file   Social Connections: Not on file   Intimate Partner Violence: Not on file   Housing Stability: Not on file          Review of Systems   Constitutional:  Negative for appetite change, chills and fever. Respiratory: Negative. Cardiovascular: Negative. Gastrointestinal: Negative. Musculoskeletal:  Negative for gait problem. Neurological:  Negative for weakness and numbness. Hematological: Negative. Psychiatric/Behavioral:  Negative for behavioral problems and confusion. Objective:      /69   Pulse 69   Ht 5' 6" (1.676 m)   Wt 73.5 kg (162 lb)   BMI 26.15 kg/m²          Physical Exam  Vitals reviewed. Constitutional:       General: She is not in acute distress. Appearance: Normal appearance. She is not toxic-appearing. Cardiovascular:      Rate and Rhythm: Normal rate and regular rhythm. Pulses: Normal pulses. Dorsalis pedis pulses are 2+ on the right side and 2+ on the left side. Posterior tibial pulses are 2+ on the right side and 2+ on the left side. Pulmonary:      Effort: Pulmonary effort is normal. No respiratory distress. Musculoskeletal:         General: Deformity present. No swelling, tenderness or signs of injury. Right lower leg: No edema. Left lower leg: No edema. Right foot: No foot drop. Left foot: No foot drop. Comments: Anterior cavus bilaterally. Tight achilles / calf with ankle equinus.    Skin:     General: Skin is warm.      Capillary Refill: Capillary refill takes less than 2 seconds. Coloration: Skin is not cyanotic. Findings: No abscess, ecchymosis, erythema or wound. Nails: There is no clubbing. Neurological:      General: No focal deficit present. Mental Status: She is alert and oriented to person, place, and time. Cranial Nerves: No cranial nerve deficit. Sensory: No sensory deficit. Motor: No weakness. Coordination: Coordination normal.   Psychiatric:         Mood and Affect: Mood normal.         Behavior: Behavior normal.         Thought Content:  Thought content normal.         Judgment: Judgment normal.

## 2023-12-13 ENCOUNTER — TELEPHONE (OUTPATIENT)
Dept: PODIATRY | Facility: CLINIC | Age: 38
End: 2023-12-13

## 2023-12-13 NOTE — TELEPHONE ENCOUNTER
I called patient to reschedule her orthotic visit with Dr. Bart Landa for 12/19/23. I explained to patient we need to go into January for orthotic casting due to it being the end of the year. Patient will be self pay for the orthotic casting visit but has told me her insurance might change. I told the patient if insurance changes and she would like to try to go through it the office would need to run the new benefits in the new year. Patient will speak to  and let me know if anything changes. She does now she will have an insurance with a high deductible and if that is the case she will decide if she would proceed.

## 2024-01-23 ENCOUNTER — PROCEDURE VISIT (OUTPATIENT)
Dept: PODIATRY | Facility: CLINIC | Age: 39
End: 2024-01-23

## 2024-01-23 VITALS — WEIGHT: 163 LBS | HEIGHT: 66 IN | BODY MASS INDEX: 26.2 KG/M2

## 2024-01-23 DIAGNOSIS — Q66.70 CAVUS DEFORMITY OF FOOT: Primary | ICD-10-CM

## 2024-01-23 PROCEDURE — L3000 FT INSERT UCB BERKELEY SHELL: HCPCS | Performed by: PODIATRIST

## 2024-01-23 NOTE — PROGRESS NOTES
Biomechanical exam was done and she was casted for orthotics.  Will call the patient when it is ready to be picked.

## 2024-01-31 ENCOUNTER — TELEPHONE (OUTPATIENT)
Dept: PODIATRY | Facility: CLINIC | Age: 39
End: 2024-01-31

## 2024-01-31 NOTE — TELEPHONE ENCOUNTER
Caller: Fartun Pina    Doctor: Raphael Villa DPM    Reason for call: Fartun was cast for orthotics on 1/23/24 and Dr. Villa wanted to know if Farutn wanted cushioning in the front toe box area.  She originally said that she did but she no longer wants cushion in the toe box area.  Please call Fartun to let her know that this was okay to change.    Call back#: [phone]

## 2024-05-18 NOTE — TELEPHONE ENCOUNTER
Please inform patient the cutoff for a negative pregnancy test is 4  Her value was 5  She should go back to the lab in 1 week for a repeat draw to confirm it has dropped  chest pain

## 2024-07-08 ENCOUNTER — OFFICE VISIT (OUTPATIENT)
Dept: FAMILY MEDICINE CLINIC | Facility: CLINIC | Age: 39
End: 2024-07-08
Payer: COMMERCIAL

## 2024-07-08 ENCOUNTER — TELEPHONE (OUTPATIENT)
Age: 39
End: 2024-07-08

## 2024-07-08 VITALS
HEART RATE: 78 BPM | HEIGHT: 66 IN | SYSTOLIC BLOOD PRESSURE: 105 MMHG | TEMPERATURE: 98.2 F | RESPIRATION RATE: 16 BRPM | OXYGEN SATURATION: 100 % | DIASTOLIC BLOOD PRESSURE: 72 MMHG | WEIGHT: 162 LBS | BODY MASS INDEX: 26.03 KG/M2

## 2024-07-08 DIAGNOSIS — Z02.82: Primary | ICD-10-CM

## 2024-07-08 DIAGNOSIS — Z11.1 ENCOUNTER FOR PPD TEST: ICD-10-CM

## 2024-07-08 PROCEDURE — 86580 TB INTRADERMAL TEST: CPT

## 2024-07-08 PROCEDURE — 99213 OFFICE O/P EST LOW 20 MIN: CPT | Performed by: FAMILY MEDICINE

## 2024-07-08 NOTE — PROGRESS NOTES
Ambulatory Visit  Name: Diane Pina      : 1985      MRN: [id number]  Encounter Provider: Madison Fagan MD  Encounter Date: 2024   Encounter department: [facility]    Assessment & Plan   1. Pediatric pre-adoption visit  2. Encounter for PPD test  -     TB Skin Test      Regarding her adoption form, discussed with patient.  Patient opted to have the PPD placed today etc. getting the QuantiFERON TB Gold test.  Patient has no acute symptoms of TB.  Patient is within her normal state of health.  Patient seems mentally sound.  Patient's form is filled out today but is not finalized yet until she gets her PPD read in 48 to 72 hours from today.  Patient understands and will return in 48 to 72 hours to have PPD read.  If is negative the form will be signed off on and given back to her stamped with a copy of the entire form for her medical records with us at that time.  RTO for her neck scheduled visit with her PCP.                 History of Present Illness     38-year-old female, new patient to me, and asked to see me for her adoption form that she says she needs filled out.  Patient is not due for her annual until after  of this year.  Patient denies pregnancy.  Patient denies tobacco alcohol or drugs.  Patient already has a 5-year-old adopted son and now is in the process of having another son adopted was born in May and this morning she needs, it is for that process.  Patient takes multivitamins only now.  And her migraines are much better as per patient.  Patient's last PPD was 2022 which was negative.  Patient has not had any positive PPD prior to that and or after that.        Review of Systems   Constitutional:  Negative for chills, fatigue, fever and unexpected weight change.   HENT:  Negative for congestion and sore throat.    Eyes:  Negative for visual disturbance.   Respiratory:  Negative for cough and shortness of breath.    Cardiovascular:   "Negative for chest pain and palpitations.   Gastrointestinal:  Negative for abdominal pain, blood in stool, nausea and vomiting.   Genitourinary:  Negative for dysuria.   Musculoskeletal:  Negative for back pain and neck pain.   Skin:  Negative for rash.   Neurological:  Negative for dizziness and headaches.   Psychiatric/Behavioral:  Negative for dysphoric mood and sleep disturbance. The patient is not nervous/anxious.        Objective     /72 (BP Location: Left arm, Patient Position: Sitting, Cuff Size: Adult)   Pulse 78   Temp 98.2 °F (36.8 °C) (Temporal)   Resp 16   Ht 5' 6\" (1.676 m)   Wt 73.5 kg (162 lb)   SpO2 100%   BMI 26.15 kg/m²     Physical Exam  Vitals reviewed.   Constitutional:       General: She is not in acute distress.     Appearance: Normal appearance. She is not ill-appearing.   HENT:      Head: Normocephalic and atraumatic.      Mouth/Throat:      Mouth: Mucous membranes are moist.   Eyes:      Extraocular Movements: Extraocular movements intact.      Conjunctiva/sclera: Conjunctivae normal.   Cardiovascular:      Rate and Rhythm: Normal rate and regular rhythm.   Pulmonary:      Effort: Pulmonary effort is normal.      Breath sounds: Normal breath sounds.   Neurological:      General: No focal deficit present.      Mental Status: She is alert and oriented to person, place, and time.   Psychiatric:         Mood and Affect: Mood normal.         Behavior: Behavior normal.         Thought Content: Thought content normal.       Administrative Statements           "

## 2024-07-08 NOTE — TELEPHONE ENCOUNTER
Pt called for information on upcoming visit, she received call that it might have to be changed. Spoke to clerical that will give her a call with the information. Pt requested a call back before 12 pm because she has a meeting and can not answer after that

## 2024-07-10 ENCOUNTER — CLINICAL SUPPORT (OUTPATIENT)
Dept: FAMILY MEDICINE CLINIC | Facility: CLINIC | Age: 39
End: 2024-07-10

## 2024-07-10 DIAGNOSIS — Z11.1 ENCOUNTER FOR PPD SKIN TEST READING: Primary | ICD-10-CM

## 2024-07-10 LAB
INDURATION: 0 MM
TB SKIN TEST: NEGATIVE

## 2024-12-19 ENCOUNTER — TELEPHONE (OUTPATIENT)
Age: 39
End: 2024-12-19

## 2024-12-19 NOTE — TELEPHONE ENCOUNTER
Pt called back to confirm her appt for tomorrow, 12/20 at 11 am. I told her that it looked good. She mentioned that Dr. Hart had a meeting. I did not see that in the schedule. Please call pt back if this time does not work. I see 2 appt scheduled for her. One for tomorrow and another for 12/23. She wants to keep the appt for tomorrow, 12/20/24.    *said she someone was checking with Deneen??**    I attempted a warm transfer to clerical; however, clerical was not available at time of call.

## 2024-12-20 ENCOUNTER — OFFICE VISIT (OUTPATIENT)
Age: 39
End: 2024-12-20
Payer: COMMERCIAL

## 2024-12-20 VITALS
DIASTOLIC BLOOD PRESSURE: 68 MMHG | SYSTOLIC BLOOD PRESSURE: 106 MMHG | WEIGHT: 163 LBS | OXYGEN SATURATION: 98 % | HEIGHT: 66 IN | RESPIRATION RATE: 16 BRPM | BODY MASS INDEX: 26.2 KG/M2 | HEART RATE: 78 BPM

## 2024-12-20 DIAGNOSIS — Z12.31 ENCOUNTER FOR SCREENING MAMMOGRAM FOR BREAST CANCER: ICD-10-CM

## 2024-12-20 DIAGNOSIS — E04.9 GOITER: ICD-10-CM

## 2024-12-20 DIAGNOSIS — Z13.6 SCREENING FOR CARDIOVASCULAR CONDITION: ICD-10-CM

## 2024-12-20 DIAGNOSIS — Z00.00 ANNUAL PHYSICAL EXAM: Primary | ICD-10-CM

## 2024-12-20 PROBLEM — M79.18 MYOFASCIAL MUSCLE PAIN: Status: RESOLVED | Noted: 2021-11-04 | Resolved: 2024-12-20

## 2024-12-20 PROCEDURE — 99396 PREV VISIT EST AGE 40-64: CPT | Performed by: INTERNAL MEDICINE

## 2024-12-20 NOTE — PATIENT INSTRUCTIONS
"Patient Education     Routine physical for adults   The Basics   Written by the doctors and editors at [facility]   What is a physical? -- A physical is a routine visit, or \"check-up,\" with your doctor. You might also hear it called a \"wellness visit\" or \"preventive visit.\"  During each visit, the doctor will:   Ask about your physical and mental health   Ask about your habits, behaviors, and lifestyle   Do an exam   Give you vaccines if needed   Talk to you about any medicines you take   Give advice about your health   Answer your questions  Getting regular check-ups is an important part of taking care of your health. It can help your doctor find and treat any problems you have. But it's also important for preventing health problems.  A routine physical is different from a \"sick visit.\" A sick visit is when you see a doctor because of a health concern or problem. Since physicals are scheduled ahead of time, you can think about what you want to ask the doctor.  How often should I get a physical? -- It depends on your age and health. In general, for people age 21 years and older:   If you are younger than 50 years, you might be able to get a physical every 3 years.   If you are 50 years or older, your doctor might recommend a physical every year.  If you have an ongoing health condition, like diabetes or high blood pressure, your doctor will probably want to see you more often.  What happens during a physical? -- In general, each visit will include:   Physical exam - The doctor or nurse will check your height, weight, heart rate, and blood pressure. They will also look at your eyes and ears. They will ask about how you are feeling and whether you have any symptoms that bother you.   Medicines - It's a good idea to bring a list of all the medicines you take to each doctor visit. Your doctor will talk to you about your medicines and answer any questions. Tell them if you are having any side effects that bother you. You " "should also tell them if you are having trouble paying for any of your medicines.   Habits and behaviors - This includes:   Your diet   Your exercise habits   Whether you smoke, drink alcohol, or use drugs   Whether you are sexually active   Whether you feel safe at home  Your doctor will talk to you about things you can do to improve your health and lower your risk of health problems. They will also offer help and support. For example, if you want to quit smoking, they can give you advice and might prescribe medicines. If you want to improve your diet or get more physical activity, they can help you with this, too.   Lab tests, if needed - The tests you get will depend on your age and situation. For example, your doctor might want to check your:   Cholesterol   Blood sugar   Iron level   Vaccines - The recommended vaccines will depend on your age, health, and what vaccines you already had. Vaccines are very important because they can prevent certain serious or deadly infections.   Discussion of screening - \"Screening\" means checking for diseases or other health problems before they cause symptoms. Your doctor can recommend screening based on your age, risk, and preferences. This might include tests to check for:   Cancer, such as breast, prostate, cervical, ovarian, colorectal, prostate, lung, or skin cancer   Sexually transmitted infections, such as chlamydia and gonorrhea   Mental health conditions like depression and anxiety  Your doctor will talk to you about the different types of screening tests. They can help you decide which screenings to have. They can also explain what the results might mean.   Answering questions - The physical is a good time to ask the doctor or nurse questions about your health. If needed, they can refer you to other doctors or specialists, too.  Adults older than 65 years often need other care, too. As you get older, your doctor will talk to you about:   How to prevent falling at " home   Hearing or vision tests   Memory testing   How to take your medicines safely   Making sure that you have the help and support you need at home  All topics are updated as new evidence becomes available and our peer review process is complete.  This topic retrieved from [employer] on: May 02, 2024.  Topic 642478 Version 1.0  Release: 32.4.3 - C32.122  © 2024 UpToDate, Inc. and/or its affiliates. All rights reserved.  Consumer Information Use and Disclaimer   Disclaimer: This generalized information is a limited summary of diagnosis, treatment, and/or medication information. It is not meant to be comprehensive and should be used as a tool to help the user understand and/or assess potential diagnostic and treatment options. It does NOT include all information about conditions, treatments, medications, side effects, or risks that may apply to a specific patient. It is not intended to be medical advice or a substitute for the medical advice, diagnosis, or treatment of a health care provider based on the health care provider's examination and assessment of a patient's specific and unique circumstances. Patients must speak with a health care provider for complete information about their health, medical questions, and treatment options, including any risks or benefits regarding use of medications. This information does not endorse any treatments or medications as safe, effective, or approved for treating a specific patient. UpToDate, Inc. and its affiliates disclaim any warranty or liability relating to this information or the use thereof.The use of this information is governed by the Terms of Use, available at https://www.[employer].com/en/know/clinical-effectiveness-terms. 2024© UpToDate, Inc. and its affiliates and/or licensors. All rights reserved.  Copyright   © 2024 UpToDate, Inc. and/or its affiliates. All rights reserved.

## 2024-12-20 NOTE — ASSESSMENT & PLAN NOTE
-heterogenous enlarged thyroid noted on US  -per patient report, she was diagnosed with Hashimoto's per Endo  -TSH has been normal, will continue to monitor  Orders:  •  TSH, 3rd generation with Free T4 reflex; Future  •  Comprehensive metabolic panel; Future  •  Thyroid Antibodies Panel; Future

## 2024-12-22 LAB
ALBUMIN SERPL-MCNC: 4.3 G/DL (ref 3.6–5.1)
ALBUMIN/GLOB SERPL: 1.8 (CALC) (ref 1–2.5)
ALP SERPL-CCNC: 50 U/L (ref 31–125)
ALT SERPL-CCNC: 7 U/L (ref 6–29)
AST SERPL-CCNC: 15 U/L (ref 10–30)
BILIRUB SERPL-MCNC: 1.1 MG/DL (ref 0.2–1.2)
BUN SERPL-MCNC: 12 MG/DL (ref 7–25)
BUN/CREAT SERPL: NORMAL (CALC) (ref 6–22)
CALCIUM SERPL-MCNC: 9.3 MG/DL (ref 8.6–10.2)
CHLORIDE SERPL-SCNC: 104 MMOL/L (ref 98–110)
CHOLEST SERPL-MCNC: 175 MG/DL
CHOLEST/HDLC SERPL: 2.7 (CALC)
CO2 SERPL-SCNC: 29 MMOL/L (ref 20–32)
CREAT SERPL-MCNC: 0.75 MG/DL (ref 0.5–0.99)
GFR/BSA.PRED SERPLBLD CYS-BASED-ARV: 103 ML/MIN/1.73M2
GLOBULIN SER CALC-MCNC: 2.4 G/DL (CALC) (ref 1.9–3.7)
GLUCOSE SERPL-MCNC: 86 MG/DL (ref 65–99)
HDLC SERPL-MCNC: 66 MG/DL
LDLC SERPL CALC-MCNC: 95 MG/DL (CALC)
NONHDLC SERPL-MCNC: 109 MG/DL (CALC)
POTASSIUM SERPL-SCNC: 3.8 MMOL/L (ref 3.5–5.3)
PROT SERPL-MCNC: 6.7 G/DL (ref 6.1–8.1)
SODIUM SERPL-SCNC: 140 MMOL/L (ref 135–146)
THYROGLOB AB SERPL-ACNC: <1 IU/ML
THYROPEROXIDASE AB SERPL-ACNC: 1 IU/ML
TRIGL SERPL-MCNC: 58 MG/DL
TSH SERPL-ACNC: 1.65 MIU/L

## 2025-01-06 ENCOUNTER — TELEPHONE (OUTPATIENT)
Age: 40
End: 2025-01-06

## 2025-01-06 NOTE — TELEPHONE ENCOUNTER
Patient called stating she had an appointment 12/20/24 for an Adoption physical and was told to leave forms behind to be filled out once blood work was in. Patient would like to pick them up tomorrow 1/7/25 if completed. Please advise back to patient with an update on this. Thank you

## 2025-03-06 ENCOUNTER — ANNUAL EXAM (OUTPATIENT)
Dept: OBGYN CLINIC | Facility: CLINIC | Age: 40
End: 2025-03-06
Payer: COMMERCIAL

## 2025-03-06 VITALS
SYSTOLIC BLOOD PRESSURE: 102 MMHG | HEIGHT: 66 IN | BODY MASS INDEX: 26.2 KG/M2 | DIASTOLIC BLOOD PRESSURE: 60 MMHG | WEIGHT: 163 LBS

## 2025-03-06 DIAGNOSIS — Z01.419 ENCOUNTER FOR ROUTINE GYNECOLOGICAL EXAMINATION WITH PAPANICOLAOU SMEAR OF CERVIX: Primary | ICD-10-CM

## 2025-03-06 DIAGNOSIS — Z12.31 ENCOUNTER FOR SCREENING MAMMOGRAM FOR MALIGNANT NEOPLASM OF BREAST: ICD-10-CM

## 2025-03-06 DIAGNOSIS — Z30.011 ENCOUNTER FOR INITIAL PRESCRIPTION OF CONTRACEPTIVE PILLS: ICD-10-CM

## 2025-03-06 DIAGNOSIS — Z11.51 SCREENING FOR HUMAN PAPILLOMAVIRUS (HPV): ICD-10-CM

## 2025-03-06 DIAGNOSIS — Z12.4 SCREENING FOR CERVICAL CANCER: ICD-10-CM

## 2025-03-06 PROCEDURE — G0145 SCR C/V CYTO,THINLAYER,RESCR: HCPCS | Performed by: PHYSICIAN ASSISTANT

## 2025-03-06 PROCEDURE — S0612 ANNUAL GYNECOLOGICAL EXAMINA: HCPCS | Performed by: PHYSICIAN ASSISTANT

## 2025-03-06 PROCEDURE — G0476 HPV COMBO ASSAY CA SCREEN: HCPCS | Performed by: PHYSICIAN ASSISTANT

## 2025-03-06 RX ORDER — NORETHINDRONE ACETATE AND ETHINYL ESTRADIOL, ETHINYL ESTRADIOL AND FERROUS FUMARATE 1MG-10(24)
1 KIT ORAL DAILY
Qty: 84 TABLET | Refills: 0 | Status: SHIPPED | OUTPATIENT
Start: 2025-03-06

## 2025-03-06 NOTE — PROGRESS NOTES
ASSESSMENT & PLAN:   Diagnoses and all orders for this visit:    Encounter for routine gynecological examination with Papanicolaou smear of cervix  -     Liquid-based pap, screening    Encounter for screening mammogram for malignant neoplasm of breast  -     Mammo screening bilateral w 3d and cad; Future    Screening for cervical cancer  -     Liquid-based pap, screening    Screening for human papillomavirus (HPV)  -     Liquid-based pap, screening    Encounter for initial prescription of contraceptive pills  -     Norethin-Eth Estrad-Fe Biphas (Lo Loestrin Fe) 1 MG-10 MCG / 10 MCG TABS; Take 1 tablet by mouth in the morning          The following were reviewed in today's visit: ASCCP guidelines, Gardasil vaccination, STD testing breast self exam, mammography screening ordered, STD testing, exercise, and healthy diet.    Patient to return to office in yearly for annual exam.     All questions have been answered to her satisfaction.        CC:  Annual Gynecologic Examination  Chief Complaint   Patient presents with    Gynecologic Exam     The patient is here for a yearly exam. Pap 10/11/2022 neg/neg HPV  neg pap 20  Adopted 1YO in     Regular periods.     No vaginal, bowel, bladder or breast problems.        HPI: Diane Pina is a 39 y.o.  who presents for annual gynecologic examination.  She has the following concerns:  would like to start COCP. No CI to estrogen      Health Maintenance:    Exercise: frequently  Breast exams/breast awareness: yes  Last mammogram: scheduled for 2025    Past Medical History:   Diagnosis Date    History of Lyme disease     patient stated it was years ago    Miscarriage 2022    Myofascial muscle pain 2021       Past Surgical History:   Procedure Laterality Date    HYMENPLASTY      LASIK Bilateral     OVUM / OOCYTE RETRIEVAL  2021    RMA    TRANSFER EMBRYO INTRAUTERINE  2022    RMA    WISDOM TOOTH EXTRACTION         Past OB/Gyn History:    Patient's last menstrual period was 03/02/2025 (exact date).    Last Pap: 2022 : no abnormalities  History of abnormal Pap smear: No  HPV vaccine completed: yes    Patient is currently sexually active.   STD testing: no  Current contraception: none      Family History  Family History   Problem Relation Age of Onset    Osteoporosis Mother     Thyroid disease Mother     Hearing loss Father     Prostate cancer Father 62    Skin cancer Father         basal cell carcinoma    Cancer Father     Arthritis Father     No Known Problems Sister     Cancer Brother     Melanoma Brother     Osteoporosis Maternal Grandmother     Osteoporosis Maternal Grandfather     Osteoporosis Maternal Aunt     Heart disease Neg Hx        Family history of uterine or ovarian cancer: no  Family history of breast cancer: no  Family history of colon cancer: no    Social History:  Social History     Socioeconomic History    Marital status: /Civil Union     Spouse name: Not on file    Number of children: Not on file    Years of education: Not on file    Highest education level: Not on file   Occupational History    Not on file   Tobacco Use    Smoking status: Never    Smokeless tobacco: Never   Vaping Use    Vaping status: Never Used   Substance and Sexual Activity    Alcohol use: Never    Drug use: Never    Sexual activity: Not Currently     Partners: Male     Birth control/protection: None   Other Topics Concern    Not on file   Social History Narrative    unemployed     Social Drivers of Health     Financial Resource Strain: Not on file   Food Insecurity: Not on file   Transportation Needs: Not on file   Physical Activity: Not on file   Stress: Not on file   Social Connections: Not on file   Intimate Partner Violence: Not on file   Housing Stability: Not on file     Domestic violence screen: negative    Allergies:  No Known Allergies    Medications:    Current Outpatient Medications:     Multiple Vitamin (multivitamin) tablet, Take 1 tablet  "by mouth daily, Disp: , Rfl:     Norethin-Eth Estrad-Fe Biphas (Lo Loestrin Fe) 1 MG-10 MCG / 10 MCG TABS, Take 1 tablet by mouth in the morning, Disp: 84 tablet, Rfl: 0    Review of Systems:  Review of Systems   Constitutional:  Negative for chills, fever and unexpected weight change.   Respiratory:  Negative for shortness of breath.    Cardiovascular:  Negative for chest pain.   Gastrointestinal:  Negative for abdominal pain, diarrhea, nausea and vomiting.   Skin:  Negative for rash.   Psychiatric/Behavioral:  Negative for dysphoric mood. The patient is not nervous/anxious.          Physical Exam:  /60   Ht 5' 6\" (1.676 m)   Wt 73.9 kg (163 lb)   LMP 03/02/2025 (Exact Date)   BMI 26.31 kg/m²    Physical Exam  Constitutional:       Appearance: Normal appearance.   Genitourinary:      Vulva and urethral meatus normal.      No lesions in the vagina.      Right Labia: No rash or lesions.     Left Labia: No lesions or rash.     Vaginal bleeding (menses) present.      No vaginal discharge or erythema.        Right Adnexa: not tender and no mass present.     Left Adnexa: not tender and no mass present.     No cervical discharge or lesion.      Uterus is not tender.   Breasts:     Breasts are symmetrical.      Right: No mass or skin change.      Left: No mass or skin change.   HENT:      Head: Normocephalic and atraumatic.   Cardiovascular:      Rate and Rhythm: Normal rate and regular rhythm.      Heart sounds: Normal heart sounds. No murmur heard.     No friction rub. No gallop.   Pulmonary:      Effort: Pulmonary effort is normal.      Breath sounds: Normal breath sounds. No wheezing, rhonchi or rales.   Abdominal:      General: Abdomen is flat. There is no distension.      Palpations: Abdomen is soft.      Tenderness: There is no abdominal tenderness.   Musculoskeletal:      Cervical back: Neck supple.   Lymphadenopathy:      Upper Body:      Right upper body: No axillary adenopathy.      Left upper body: No " axillary adenopathy.   Neurological:      General: No focal deficit present.      Mental Status: She is alert.   Skin:     General: Skin is warm and dry.   Psychiatric:         Mood and Affect: Mood normal.         Behavior: Behavior normal.   Vitals reviewed.

## 2025-03-10 ENCOUNTER — RESULTS FOLLOW-UP (OUTPATIENT)
Dept: OBGYN CLINIC | Facility: CLINIC | Age: 40
End: 2025-03-10

## 2025-03-12 LAB
LAB AP GYN PRIMARY INTERPRETATION: NORMAL
Lab: NORMAL

## 2025-04-15 ENCOUNTER — HOSPITAL ENCOUNTER (OUTPATIENT)
Dept: MAMMOGRAPHY | Facility: HOSPITAL | Age: 40
Discharge: HOME | End: 2025-04-15
Payer: COMMERCIAL

## 2025-04-15 VITALS — WEIGHT: 163 LBS | BODY MASS INDEX: 26.2 KG/M2 | HEIGHT: 66 IN

## 2025-04-15 DIAGNOSIS — Z12.31 ENCOUNTER FOR SCREENING MAMMOGRAM FOR BREAST CANCER: ICD-10-CM

## 2025-04-15 PROCEDURE — 77063 BREAST TOMOSYNTHESIS BI: CPT

## 2025-04-15 PROCEDURE — 77067 SCR MAMMO BI INCL CAD: CPT

## 2025-05-27 DIAGNOSIS — Z30.011 ENCOUNTER FOR INITIAL PRESCRIPTION OF CONTRACEPTIVE PILLS: ICD-10-CM

## 2025-05-28 DIAGNOSIS — Z30.011 ENCOUNTER FOR INITIAL PRESCRIPTION OF CONTRACEPTIVE PILLS: ICD-10-CM

## 2025-05-28 RX ORDER — NORETHINDRONE ACETATE AND ETHINYL ESTRADIOL, ETHINYL ESTRADIOL AND FERROUS FUMARATE 1MG-10(24)
1 KIT ORAL EVERY MORNING
Qty: 84 TABLET | Refills: 0 | Status: SHIPPED | OUTPATIENT
Start: 2025-05-28

## 2025-05-28 RX ORDER — NORETHINDRONE ACETATE AND ETHINYL ESTRADIOL, ETHINYL ESTRADIOL AND FERROUS FUMARATE 1MG-10(24)
1 KIT ORAL DAILY
Qty: 84 TABLET | Refills: 0 | OUTPATIENT
Start: 2025-05-28

## 2025-06-09 ENCOUNTER — OFFICE VISIT (OUTPATIENT)
Age: 40
End: 2025-06-09
Payer: COMMERCIAL

## 2025-06-09 VITALS
BODY MASS INDEX: 26.2 KG/M2 | OXYGEN SATURATION: 99 % | WEIGHT: 163 LBS | HEIGHT: 66 IN | RESPIRATION RATE: 15 BRPM | HEART RATE: 102 BPM | TEMPERATURE: 97.9 F

## 2025-06-09 DIAGNOSIS — J06.9 ACUTE UPPER RESPIRATORY INFECTION: Primary | ICD-10-CM

## 2025-06-09 PROCEDURE — 99213 OFFICE O/P EST LOW 20 MIN: CPT | Performed by: STUDENT IN AN ORGANIZED HEALTH CARE EDUCATION/TRAINING PROGRAM

## 2025-06-09 RX ORDER — AZITHROMYCIN 250 MG/1
TABLET, FILM COATED ORAL
Qty: 6 TABLET | Refills: 0 | Status: SHIPPED | OUTPATIENT
Start: 2025-06-09 | End: 2025-06-14

## 2025-06-09 NOTE — PROGRESS NOTES
[facility] [place]  OFFICE VISIT     PATIENT INFORMATION     Diane Pina   39 y.o. female   MRN: [id number]    ASSESSMENT/PLAN     Assessment & Plan  Acute upper respiratory infection  Patient presents to clinic today with concern of possible upper respiratory infection.  For the past 4 days or so she has been experiencing sore throat, cough/congestion, runny nose, fatigue.  This has been persistent and not improving.  Her  has been sick with similar symptoms for the past 2 weeks.  He is just going to see his doctor today for treatment.  She is also been in the hospital with her 1-year-old son over the past 3 days and had many potential sick exposures.  She denies any fevers.  Patient will also be adopting a new child and states that the biologic mother of the child is currently laboring.  She anticipates that she will need to fly to go see the new baby any day.  Physical exam shows erythematous posterior oropharynx.  Bilateral nares with rhinorrhea.  Given sick contacts in lack of improvement with symptoms suspect patient likely has bacterial upper respiratory infection.  Will treat as such with course of azithromycin.  Patient may use continued supportive/over-the-counter care for other symptoms.  Tylenol/ibuprofen, lozenges, throat sprays for sore throat.  Mucinex for congestion.  Recommended Flonase or saline spray for rhinorrhea.  Orders:    azithromycin (Zithromax) 250 mg tablet; Take 2 tablets (500 mg total) by mouth daily for 1 day, THEN 1 tablet (250 mg total) daily for 4 days.         HEALTH MAINTENANCE     Immunization History   Administered Date(s) Administered    COVID-19 J&J (Wilma) vaccine 0.5 mL 03/28/2021    COVID-19 PFIZER VACCINE 0.3 ML IM 11/28/2021    COVID-19 Pfizer Vac BIVALENT Len-sucrose 12 Yr+ IM 11/18/2022    COVID-19 Pfizer mRNA vacc PF len-sucrose 12 yr and older (Comirnaty) 10/20/2023, 09/17/2024    INFLUENZA 10/12/2020    Influenza Injectable,  "MDCK, Preservative Free, Quadrivalent, 0.5 mL 10/12/2020    Influenza Quadrivalent 3 years and older 11/28/2021    Tdap 07/07/2017    Tuberculin Skin Test-PPD Intradermal 03/22/2022, 07/08/2024    influenza, injectable, quadrivalent 10/11/2020         CHIEF COMPLAINT     Chief Complaint   Patient presents with    Cold Like Symptoms     06/05      HISTORY OF PRESENT ILLNESS     Ms. Fartun Pina is a 39-year-old female with past medical history of goiter, paresthesias, seasonal allergies, history of Lyme disease. She presents to clinic today for same-day sick visit with concern of cough, congestion, sore throat.    REVIEW OF SYSTEMS     Review of Systems   Constitutional:  Positive for fatigue. Negative for chills and fever.   HENT:  Positive for rhinorrhea and sore throat. Negative for ear pain.    Eyes:  Negative for pain and visual disturbance.   Respiratory:  Positive for cough. Negative for shortness of breath.    Cardiovascular:  Negative for chest pain and palpitations.   Gastrointestinal:  Negative for abdominal pain and vomiting.   Genitourinary:  Negative for dysuria and hematuria.   Musculoskeletal:  Negative for arthralgias and back pain.   Skin:  Negative for color change and rash.   Neurological:  Negative for seizures and syncope.   All other systems reviewed and are negative.    OBJECTIVE     Vitals:    06/09/25 1019   Pulse: 102   Resp: 15   Temp: 97.9 °F (36.6 °C)   SpO2: 99%   Weight: 73.9 kg (163 lb)   Height: 5' 6\" (1.676 m)     Physical Exam  Constitutional:       General: She is not in acute distress.     Appearance: Normal appearance. She is ill-appearing.   HENT:      Right Ear: Tympanic membrane, ear canal and external ear normal.      Left Ear: Tympanic membrane, ear canal and external ear normal.      Nose: Rhinorrhea present. No congestion.      Mouth/Throat:      Mouth: Mucous membranes are moist.      Pharynx: Oropharynx is clear. Posterior oropharyngeal erythema present. "     Cardiovascular:      Rate and Rhythm: Normal rate and regular rhythm.      Pulses: Normal pulses.      Heart sounds: Normal heart sounds. No murmur heard.  Pulmonary:      Effort: Pulmonary effort is normal. No respiratory distress.      Breath sounds: Normal breath sounds. No wheezing, rhonchi or rales.   Abdominal:      General: Abdomen is flat. Bowel sounds are normal. There is no distension.      Palpations: Abdomen is soft.      Tenderness: There is no abdominal tenderness.     Musculoskeletal:      Cervical back: Normal range of motion and neck supple. No tenderness.      Right lower leg: No edema.      Left lower leg: No edema.   Lymphadenopathy:      Cervical: No cervical adenopathy.     Skin:     General: Skin is warm and dry.     Neurological:      Mental Status: She is alert and oriented to person, place, and time.     Psychiatric:         Mood and Affect: Mood normal.         Behavior: Behavior normal.         Thought Content: Thought content normal.       CURRENT MEDICATIONS   Current Medications[1]    PAST MEDICAL & SURGICAL HISTORY   Past Medical History[2]  Past Surgical History[3]  SOCIAL & FAMILY HISTORY     Social History     Socioeconomic History    Marital status: /Civil Union     Spouse name: Not on file    Number of children: Not on file    Years of education: Not on file    Highest education level: Not on file   Occupational History    Not on file   Tobacco Use    Smoking status: Never    Smokeless tobacco: Never   Vaping Use    Vaping status: Never Used   Substance and Sexual Activity    Alcohol use: Never    Drug use: Never    Sexual activity: Not Currently     Partners: Male     Birth control/protection: None   Other Topics Concern    Not on file   Social History Narrative    unemployed     Social Drivers of Health     Financial Resource Strain: Not on file   Food Insecurity: Not on file   Transportation Needs: Not on file   Physical Activity: Not on file   Stress: Not on file    Social Connections: Not on file   Intimate Partner Violence: Not on file   Housing Stability: Not on file     Social History     Substance and Sexual Activity   Alcohol Use Never       Social History     Substance and Sexual Activity   Drug Use Never     Tobacco Use History[4]  Family History[5]  ==  Kevin Norris DO  [facility]  [facility] [address] [place] [address] [address] [place] [address]  Office: [phone]  Fax: [phone]         [1]   Current Outpatient Medications:     azithromycin (Zithromax) 250 mg tablet, Take 2 tablets (500 mg total) by mouth daily for 1 day, THEN 1 tablet (250 mg total) daily for 4 days., Disp: 6 tablet, Rfl: 0    Lo Loestrin Fe 1 MG-10 MCG / 10 MCG TABS, take 1 tablet by mouth every morning, Disp: 84 tablet, Rfl: 0    Multiple Vitamin (multivitamin) tablet, Take 1 tablet by mouth daily, Disp: , Rfl:   [2]   Past Medical History:  Diagnosis Date    History of Lyme disease     patient stated it was years ago    Miscarriage 6/2022    Myofascial muscle pain 11/04/2021   [3]   Past Surgical History:  Procedure Laterality Date    HYMENPLASTY      LASIK Bilateral     OVUM / OOCYTE RETRIEVAL  12/21/2021    RMA    TRANSFER EMBRYO INTRAUTERINE  02/04/2022    RMA    WISDOM TOOTH EXTRACTION     [4]   Social History  Tobacco Use   Smoking Status Never   Smokeless Tobacco Never   [5]   Family History  Problem Relation Name Age of Onset    Osteoporosis Mother Zuly     Thyroid disease Mother Zuly     Hearing loss Father Brad     Prostate cancer Father Brad 62    Skin cancer Father Brad         basal cell carcinoma    Cancer Father Brad     Arthritis Father Brad     No Known Problems Sister      Osteoporosis Maternal Grandmother      Osteoporosis Maternal Grandfather Phong     No Known Problems Paternal Grandmother      No Known Problems Paternal Grandfather      Cancer Brother Suhas     Melanoma Brother Suhas     Heart disease Neg Hx

## 2025-08-11 ENCOUNTER — NURSE TRIAGE (OUTPATIENT)
Age: 40
End: 2025-08-11

## 2025-08-12 ENCOUNTER — OFFICE VISIT (OUTPATIENT)
Dept: OBGYN CLINIC | Facility: CLINIC | Age: 40
End: 2025-08-12
Payer: COMMERCIAL

## 2025-08-20 ENCOUNTER — HOSPITAL ENCOUNTER (OUTPATIENT)
Dept: ULTRASOUND IMAGING | Facility: HOSPITAL | Age: 40
Discharge: HOME | End: 2025-08-20
Attending: OBSTETRICS & GYNECOLOGY
Payer: COMMERCIAL

## 2025-08-20 DIAGNOSIS — N93.9 ABNORMAL UTERINE BLEEDING: ICD-10-CM

## 2025-08-20 PROCEDURE — 76856 US EXAM PELVIC COMPLETE: CPT

## 2025-08-20 PROCEDURE — 76830 TRANSVAGINAL US NON-OB: CPT
